# Patient Record
Sex: FEMALE | Race: WHITE | NOT HISPANIC OR LATINO | Employment: FULL TIME
[De-identification: names, ages, dates, MRNs, and addresses within clinical notes are randomized per-mention and may not be internally consistent; named-entity substitution may affect disease eponyms.]

---

## 2017-07-29 ENCOUNTER — HEALTH MAINTENANCE LETTER (OUTPATIENT)
Age: 47
End: 2017-07-29

## 2018-12-04 ENCOUNTER — OFFICE VISIT (OUTPATIENT)
Dept: FAMILY MEDICINE | Facility: CLINIC | Age: 48
End: 2018-12-04
Payer: COMMERCIAL

## 2018-12-04 VITALS
OXYGEN SATURATION: 98 % | TEMPERATURE: 98.3 F | BODY MASS INDEX: 25.1 KG/M2 | DIASTOLIC BLOOD PRESSURE: 76 MMHG | HEIGHT: 62 IN | SYSTOLIC BLOOD PRESSURE: 110 MMHG | HEART RATE: 92 BPM | WEIGHT: 136.4 LBS

## 2018-12-04 DIAGNOSIS — J45.20 MILD INTERMITTENT ASTHMA WITHOUT COMPLICATION: ICD-10-CM

## 2018-12-04 DIAGNOSIS — E16.2 HYPOGLYCEMIA, UNSPECIFIED: ICD-10-CM

## 2018-12-04 DIAGNOSIS — Z23 NEED FOR PROPHYLACTIC VACCINATION AND INOCULATION AGAINST INFLUENZA: ICD-10-CM

## 2018-12-04 DIAGNOSIS — Z00.00 ENCOUNTER FOR WELL ADULT EXAM WITHOUT ABNORMAL FINDINGS: Primary | ICD-10-CM

## 2018-12-04 LAB
ANION GAP SERPL CALCULATED.3IONS-SCNC: 4 MMOL/L (ref 3–14)
BUN SERPL-MCNC: 15 MG/DL (ref 7–30)
CALCIUM SERPL-MCNC: 8.7 MG/DL (ref 8.5–10.1)
CHLORIDE SERPL-SCNC: 107 MMOL/L (ref 94–109)
CHOLEST SERPL-MCNC: 224 MG/DL
CO2 SERPL-SCNC: 31 MMOL/L (ref 20–32)
CREAT SERPL-MCNC: 0.76 MG/DL (ref 0.52–1.04)
GFR SERPL CREATININE-BSD FRML MDRD: 81 ML/MIN/1.7M2
GLUCOSE SERPL-MCNC: 77 MG/DL (ref 70–99)
HDLC SERPL-MCNC: 66 MG/DL
LDLC SERPL CALC-MCNC: 130 MG/DL
NONHDLC SERPL-MCNC: 158 MG/DL
POTASSIUM SERPL-SCNC: 3.8 MMOL/L (ref 3.4–5.3)
SODIUM SERPL-SCNC: 142 MMOL/L (ref 133–144)
TRIGL SERPL-MCNC: 142 MG/DL
TSH SERPL DL<=0.005 MIU/L-ACNC: 1.24 MU/L (ref 0.4–4)

## 2018-12-04 PROCEDURE — 80061 LIPID PANEL: CPT | Performed by: FAMILY MEDICINE

## 2018-12-04 PROCEDURE — 36415 COLL VENOUS BLD VENIPUNCTURE: CPT | Performed by: FAMILY MEDICINE

## 2018-12-04 PROCEDURE — 99396 PREV VISIT EST AGE 40-64: CPT | Performed by: FAMILY MEDICINE

## 2018-12-04 PROCEDURE — 80048 BASIC METABOLIC PNL TOTAL CA: CPT | Performed by: FAMILY MEDICINE

## 2018-12-04 PROCEDURE — 84443 ASSAY THYROID STIM HORMONE: CPT | Performed by: FAMILY MEDICINE

## 2018-12-04 RX ORDER — HYDROCHLOROTHIAZIDE 25 MG/1
1 TABLET ORAL DAILY
COMMUNITY
Start: 2018-10-09 | End: 2018-12-04

## 2018-12-04 RX ORDER — HYDROCHLOROTHIAZIDE 25 MG/1
25 TABLET ORAL DAILY
Qty: 90 TABLET | Refills: 3 | Status: SHIPPED | OUTPATIENT
Start: 2018-12-04 | End: 2019-04-15

## 2018-12-04 RX ORDER — ALBUTEROL SULFATE 90 UG/1
2 AEROSOL, METERED RESPIRATORY (INHALATION) EVERY 6 HOURS PRN
Qty: 1 INHALER | Refills: 1 | Status: SHIPPED | OUTPATIENT
Start: 2018-12-04 | End: 2019-04-15

## 2018-12-04 ASSESSMENT — ENCOUNTER SYMPTOMS
HEMATOCHEZIA: 0
DYSURIA: 0
HEMATURIA: 0
NAUSEA: 0
MYALGIAS: 1
BREAST MASS: 0
DIZZINESS: 0
CONSTIPATION: 0
JOINT SWELLING: 1
NERVOUS/ANXIOUS: 0
ARTHRALGIAS: 1
COUGH: 0
PALPITATIONS: 0
FEVER: 0
EYE PAIN: 0
HEARTBURN: 0
DIARRHEA: 0
SHORTNESS OF BREATH: 0
CHILLS: 0
HEADACHES: 0
PARESTHESIAS: 0
ABDOMINAL PAIN: 0
WEAKNESS: 0
SORE THROAT: 0
FREQUENCY: 0

## 2018-12-04 NOTE — PROGRESS NOTES
SUBJECTIVE:   CC: Nessa Briceño is an 48 year old woman who presents for preventive health visit.     Physical   Annual:     Getting at least 3 servings of Calcium per day:  NO    Bi-annual eye exam:  Yes    Dental care twice a year:  NO    Sleep apnea or symptoms of sleep apnea:  None    Diet:  Regular (no restrictions)    Frequency of exercise:  None    Taking medications regularly:  Yes    Medication side effects:  Not applicable    Additional concerns today:  No    PHQ-2 Total Score: 2              Asthma Follow-Up    Was ACT completed today?  No      Respiratory symptoms:   Cough: No   Wheezing: No   Shortness of breath: No    Use of short- acting(rescue) inhaler: albuterol     Taking controlled (daily) meds as prescribed: No    ER/UC visits or hospital admissions since last visit: none     Recent asthma triggers that patient is dealing with: None       Today's PHQ-2 Score:   PHQ-2 ( 1999 [employer]) 12/4/2018   Q1: Little interest or pleasure in doing things 1   Q2: Feeling down, depressed or hopeless 1   PHQ-2 Score 2   Q1: Little interest or pleasure in doing things Several days   Q2: Feeling down, depressed or hopeless Several days   PHQ-2 Score 2       Abuse: Current or Past(Physical, Sexual or Emotional)- No  Do you feel safe in your environment? Sometimes, works at a [employer]    Social History   Substance Use Topics     Smoking status: Never Smoker     Smokeless tobacco: Never Used     Alcohol use Yes      Comment: occ     Alcohol Use 12/4/2018   If you drink alcohol do you typically have greater than 3 drinks per day OR greater than 7 drinks per week? No   No flowsheet data found.    Reviewed orders with patient.  Reviewed health maintenance and updated orders accordingly - Yes  Labs reviewed in EPIC    Mammogram Screening: Patient under age 50, mutual decision reflected in health maintenance.      Pertinent mammograms are reviewed under the imaging tab.  History of abnormal Pap smear: Status post benign  "hysterectomy. Health Maintenance and Surgical History updated.  PAP / HPV 6/3/2009 4/24/2008   PAP OTHER-NIL EM>40 NIL     Reviewed and updated as needed this visit by clinical staff  Tobacco  Allergies  Meds  Problems  Med Hx  Surg Hx  Fam Hx  Soc Hx          Reviewed and updated as needed this visit by Provider  Allergies  Meds  Problems            Review of Systems   Constitutional: Negative for chills and fever.   HENT: Negative for congestion, ear pain, hearing loss and sore throat.    Eyes: Positive for visual disturbance. Negative for pain.   Respiratory: Negative for cough and shortness of breath.    Cardiovascular: Positive for peripheral edema. Negative for chest pain and palpitations.   Gastrointestinal: Negative for abdominal pain, constipation, diarrhea, heartburn, hematochezia and nausea.   Breasts:  Negative for tenderness, breast mass and discharge.   Genitourinary: Negative for dysuria, frequency, genital sores, hematuria, pelvic pain, urgency, vaginal bleeding and vaginal discharge.   Musculoskeletal: Positive for arthralgias, joint swelling and myalgias.   Skin: Negative for rash.   Neurological: Negative for dizziness, weakness, headaches and paresthesias.   Psychiatric/Behavioral: Negative for mood changes. The patient is not nervous/anxious.           OBJECTIVE:   /76 (BP Location: Right arm, Patient Position: Chair, Cuff Size: Adult Regular)  Pulse 92  Temp 98.3  F (36.8  C) (Tympanic)  Ht 5' 1.5\" (1.562 m)  Wt 136 lb 6.4 oz (61.9 kg)  LMP 09/28/2011  SpO2 98%  Breastfeeding? No  BMI 25.36 kg/m2  Physical Exam  GENERAL: healthy, alert and no distress  EYES: Eyes grossly normal to inspection, PERRL and conjunctivae and sclerae normal  HENT: ear canals and TM's normal, nose and mouth without ulcers or lesions  NECK: no adenopathy, no asymmetry, masses, or scars and thyroid normal to palpation  RESP: lungs clear to auscultation - no rales, rhonchi or wheezes  BREAST: " "normal without masses, tenderness or nipple discharge and no palpable axillary masses or adenopathy  CV: regular rate and rhythm, normal S1 S2, no S3 or S4, no murmur, click or rub, no peripheral edema and peripheral pulses strong  ABDOMEN: soft, nontender, no hepatosplenomegaly, no masses and bowel sounds normal  MS: no gross musculoskeletal defects noted, no edema  SKIN: no suspicious lesions or rashes  NEURO: Normal strength and tone, mentation intact and speech normal  PSYCH: mentation appears normal, affect normal/bright    Diagnostic Test Results:  none     ASSESSMENT/PLAN:   1. Encounter for well adult exam without abnormal findings    - Lipid panel reflex to direct LDL Fasting  - Basic metabolic panel  - TSH with free T4 reflex  - hydrochlorothiazide (HYDRODIURIL) 25 MG tablet; Take 1 tablet (25 mg) by mouth daily  Dispense: 90 tablet; Refill: 3    2. HYPOGLYCEMIA NOS      3. Mild intermittent asthma without complication    - albuterol (PROAIR HFA/PROVENTIL HFA/VENTOLIN HFA) 108 (90 Base) MCG/ACT inhaler; Inhale 2 puffs into the lungs every 6 hours as needed for shortness of breath / dyspnea or wheezing  Dispense: 1 Inhaler; Refill: 1          COUNSELING:  Reviewed preventive health counseling, as reflected in patient instructions    BP Readings from Last 1 Encounters:   12/04/18 110/76     Estimated body mass index is 25.36 kg/(m^2) as calculated from the following:    Height as of this encounter: 5' 1.5\" (1.562 m).    Weight as of this encounter: 136 lb 6.4 oz (61.9 kg).           reports that she has never smoked. She has never used smokeless tobacco.      Counseling Resources:  ATP IV Guidelines  Pooled Cohorts Equation Calculator  Breast Cancer Risk Calculator  FRAX Risk Assessment  ICSI Preventive Guidelines  Dietary Guidelines for Americans, 2010  USDA's MyPlate  ASA Prophylaxis  Lung CA Screening    Mita Delgadillo MD  [facility]  "

## 2018-12-04 NOTE — MR AVS SNAPSHOT
After Visit Summary   12/4/2018    Nessa Briceño    MRN: [id number]           Patient Information     Date Of Birth          1970        Visit Information        Provider Department      12/4/2018 2:20 PM Mita Delgadillo MD [facility]        Today's Diagnoses     Encounter for well adult exam without abnormal findings    -  1    HYPOGLYCEMIA NOS        Mild intermittent asthma without complication        Need for prophylactic vaccination and inoculation against influenza          Care Instructions      Preventive Health Recommendations  Female Ages 40 to 49    Yearly exam:     See your health care provider every year in order to  1. Review health changes.   2. Discuss preventive care.    3. Review your medicines if your doctor prescribed any.      Get a Pap test every three years (unless you have an abnormal result and your provider advises testing more often).      If you get Pap tests with HPV test, you only need to test every 5 years, unless you have an abnormal result. You do not need a Pap test if your uterus was removed (hysterectomy) and you have not had cancer.      You should be tested each year for STDs (sexually transmitted diseases), if you're at risk.     Ask your doctor if you should have a mammogram.      Have a colonoscopy (test for colon cancer) if someone in your family has had colon cancer or polyps before age 50.       Have a cholesterol test every 5 years.       Have a diabetes test (fasting glucose) after age 45. If you are at risk for diabetes, you should have this test every 3 years.    Shots: Get a flu shot each year. Get a tetanus shot every 10 years.     Nutrition:     Eat at least 5 servings of fruits and vegetables each day.    Eat whole-grain bread, whole-wheat pasta and brown rice instead of white grains and rice.    Get adequate Calcium and Vitamin D.      Lifestyle    Exercise at least 150 minutes a week (an average of 30 minutes a day,  "5 days a week). This will help you control your weight and prevent disease.    Limit alcohol to one drink per day.    No smoking.     Wear sunscreen to prevent skin cancer.    See your dentist every six months for an exam and cleaning.          Follow-ups after your visit        Who to contact     If you have questions or need follow up information about today's clinic visit or your schedule please contact [facility] directly at [phone].  Normal or non-critical lab and imaging results will be communicated to you by Thanxhart, letter or phone within 4 business days after the clinic has received the results. If you do not hear from us within 7 days, please contact the clinic through avVenta or phone. If you have a critical or abnormal lab result, we will notify you by phone as soon as possible.  Submit refill requests through avVenta or call your pharmacy and they will forward the refill request to us. Please allow 3 business days for your refill to be completed.          Additional Information About Your Visit        avVenta Information     avVenta gives you secure access to your electronic health record. If you see a primary care provider, you can also send messages to your care team and make appointments. If you have questions, please call your primary care clinic.  If you do not have a primary care provider, please call [phone] and they will assist you.        Care EveryWhere ID     This is your Care EveryWhere ID. This could be used by other organizations to access your [facility] medical records  [id number]        Your Vitals Were     Pulse Temperature Height Last Period Pulse Oximetry Breastfeeding?    92 98.3  F (36.8  C) (Tympanic) 5' 1.5\" (1.562 m) 09/28/2011 98% No    BMI (Body Mass Index)                   25.36 kg/m2            Blood Pressure from Last 3 Encounters:   12/04/18 110/76   06/25/14 112/76   06/24/14 107/81    Weight from Last 3 Encounters:   12/04/18 136 lb 6.4 oz " (61.9 kg)   06/25/14 145 lb (65.8 kg)   02/06/12 143 lb 12.8 oz (65.2 kg)              We Performed the Following     Basic metabolic panel     Lipid panel reflex to direct LDL Fasting     TSH with free T4 reflex          Today's Medication Changes          These changes are accurate as of 12/4/18  2:51 PM.  If you have any questions, ask your nurse or doctor.               These medicines have changed or have updated prescriptions.        Dose/Directions    * albuterol (2.5 MG/3ML) 0.083% neb solution   Commonly known as:  PROVENTIL   This may have changed:  Another medication with the same name was added. Make sure you understand how and when to take each.   Used for:  Intermittent asthma   Changed by:  Mita Delgadillo MD        every 4 hours prn   Quantity:  1 Each   Refills:  3       * albuterol 108 (90 Base) MCG/ACT inhaler   Commonly known as:  PROAIR HFA/PROVENTIL HFA/VENTOLIN HFA   This may have changed:  You were already taking a medication with the same name, and this prescription was added. Make sure you understand how and when to take each.   Used for:  Mild intermittent asthma without complication   Changed by:  Mita Delgadillo MD        Dose:  2 puff   Inhale 2 puffs into the lungs every 6 hours as needed for shortness of breath / dyspnea or wheezing   Quantity:  1 Inhaler   Refills:  1       hydrochlorothiazide 25 MG tablet   Commonly known as:  HYDRODIURIL   This may have changed:  how to take this   Used for:  Encounter for well adult exam without abnormal findings   Changed by:  Mita Delgadillo MD        Dose:  25 mg   Take 1 tablet (25 mg) by mouth daily   Quantity:  90 tablet   Refills:  3       * Notice:  This list has 2 medication(s) that are the same as other medications prescribed for you. Read the directions carefully, and ask your doctor or other care provider to review them with you.         Where to get your medicines      These medications were sent to [id number] [place], [place] - [address]  [address] [facility] [address]    Hours:  Closed 10-16-08 business to [facility] Phone:  [phone]     albuterol 108 (90 Base) MCG/ACT inhaler    hydrochlorothiazide 25 MG tablet                Primary Care Provider Office Phone # Fax #    Mita Delgadillo [phone] [address] [facility] [address]        Equal Access to Services     HODAN SIBLEY : Hadii aad ku hadasho Soomaali, waaxda luqadaha, qaybta kaalmada adeegyada, waxay idiin hayaan burt gilman . So [facility] [phone].    ATENCIÓN: Si habla español, tiene a hurtado disposición servicios gratuitos de asistencia lingüística. [facility] [phone].    We comply with applicable federal civil rights laws and [place] laws. We do not discriminate on the basis of race, color, national origin, age, disability, sex, sexual orientation, or gender identity.            Thank you!     Thank you for choosing [facility]  for your care. Our goal is always to provide you with excellent care. Hearing back from our patients is one way we can continue to improve our services. Please take a few minutes to complete the written survey that you may receive in the mail after your visit with us. Thank you!             Your Updated Medication List - Protect others around you: Learn how to safely use, store and throw away your medicines at www.disposemymeds.org.          This list is accurate as of 12/4/18  2:51 PM.  Always use your most recent med list.                   Brand Name Dispense Instructions for use Diagnosis    * albuterol (2.5 MG/3ML) 0.083% neb solution    PROVENTIL    1 Each    every 4 hours prn    Intermittent asthma       * albuterol 108 (90 Base) MCG/ACT inhaler    PROAIR HFA/PROVENTIL HFA/VENTOLIN HFA    1 Inhaler    Inhale 2 puffs into the lungs every 6 hours as needed for shortness of breath / dyspnea or wheezing    Mild intermittent  asthma without complication       albuterol 90 MCG/ACT inhaler     1 Inhaler    Inhale 2 puffs into the lungs every 6 hours as needed for shortness of breath / dyspnea.    Intermittent asthma       BLOOD GLUCOSE TEST STRIPS STRP     100 Strip    test four times a day-one touch ultra    Hypoglycemia, unspecified       DIABETIC STERILE LANCETS device     DQS    test four times a day-one touch ultra    Hypoglycemia, unspecified       hydrochlorothiazide 25 MG tablet    HYDRODIURIL    90 tablet    Take 1 tablet (25 mg) by mouth daily    Encounter for well adult exam without abnormal findings       HYDROcodone-acetaminophen 5-325 MG tablet    NORCO    15 tablet    Take 1-2 tablets by mouth every 4 hours as needed for moderate to severe pain        ibuprofen 600 MG tablet    ADVIL/MOTRIN     1 TABLET 4 TIMES DAILY AS NEEDED        omeprazole 20 MG DR capsule    priLOSEC    90 capsule    Take 1 capsule by mouth daily.    GERD (gastroesophageal reflux disease)       * Notice:  This list has 2 medication(s) that are the same as other medications prescribed for you. Read the directions carefully, and ask your doctor or other care provider to review them with you.

## 2018-12-04 NOTE — NURSING NOTE
"Chief Complaint   Patient presents with     Physical       Initial /76 (BP Location: Right arm, Patient Position: Chair, Cuff Size: Adult Regular)  Pulse 92  Temp 98.3  F (36.8  C) (Tympanic)  Ht 5' 1.5\" (1.562 m)  Wt 136 lb 6.4 oz (61.9 kg)  LMP 09/28/2011  SpO2 98%  Breastfeeding? No  BMI 25.36 kg/m2 Estimated body mass index is 25.36 kg/(m^2) as calculated from the following:    Height as of this encounter: 5' 1.5\" (1.562 m).    Weight as of this encounter: 136 lb 6.4 oz (61.9 kg).    Patient presents to the clinic using No DME    Health Maintenance that is potentially due pending provider review:  NONE    n/a    Is there anyone who you would like to be able to receive your results? No  If yes have patient fill out HANNA    "

## 2019-01-07 PROBLEM — J45.20 MILD INTERMITTENT ASTHMA WITHOUT COMPLICATION: Status: ACTIVE | Noted: 2019-01-07

## 2019-02-05 ENCOUNTER — TELEPHONE (OUTPATIENT)
Dept: FAMILY MEDICINE | Facility: CLINIC | Age: 49
End: 2019-02-05

## 2019-02-05 NOTE — TELEPHONE ENCOUNTER
Panel Management Review      Patient has the following on her problem list:     Asthma review     ACT Total Scores 6/23/2011   ACT TOTAL SCORE 22   ASTHMA ER VISITS 0 = None   ASTHMA HOSPITALIZATIONS 0 = None      1. Is Asthma diagnosis on the Problem List? Yes    2. Is Asthma listed on Health Maintenance? Yes    3. Patient is due for:  ACT and AAP      Composite cancer screening  Chart review shows that this patient is due/due soon for the following None  Summary:    Patient is due/failing the following:   AAP and ACT    Action needed:   Patient needs to do ACT.    Type of outreach:    Phone, left message for patient to call back.     Questions for provider review:    None                                                                                                                                    Lianet Philip [facility]       Chart routed to Care Team .

## 2019-03-05 ENCOUNTER — MYC MEDICAL ADVICE (OUTPATIENT)
Dept: FAMILY MEDICINE | Facility: CLINIC | Age: 49
End: 2019-03-05

## 2019-03-06 ENCOUNTER — MYC MEDICAL ADVICE (OUTPATIENT)
Dept: FAMILY MEDICINE | Facility: CLINIC | Age: 49
End: 2019-03-06

## 2019-03-06 DIAGNOSIS — J45.20 MILD INTERMITTENT ASTHMA WITHOUT COMPLICATION: Primary | ICD-10-CM

## 2019-04-15 ENCOUNTER — MYC MEDICAL ADVICE (OUTPATIENT)
Dept: FAMILY MEDICINE | Facility: CLINIC | Age: 49
End: 2019-04-15

## 2019-04-15 DIAGNOSIS — Z00.00 ENCOUNTER FOR WELL ADULT EXAM WITHOUT ABNORMAL FINDINGS: ICD-10-CM

## 2019-04-15 DIAGNOSIS — J45.20 MILD INTERMITTENT ASTHMA WITHOUT COMPLICATION: ICD-10-CM

## 2019-04-15 RX ORDER — HYDROCHLOROTHIAZIDE 25 MG/1
25 TABLET ORAL DAILY
Qty: 90 TABLET | Refills: 2 | Status: SHIPPED | OUTPATIENT
Start: 2019-04-15 | End: 2022-07-06

## 2019-04-15 RX ORDER — ALBUTEROL SULFATE 90 UG/1
2 AEROSOL, METERED RESPIRATORY (INHALATION) EVERY 6 HOURS PRN
Qty: 18 G | Refills: 1 | Status: SHIPPED | OUTPATIENT
Start: 2019-04-15

## 2019-04-24 ASSESSMENT — ASTHMA QUESTIONNAIRES: ACT_TOTALSCORE: 24

## 2019-07-02 ENCOUNTER — TELEPHONE (OUTPATIENT)
Dept: FAMILY MEDICINE | Facility: CLINIC | Age: 49
End: 2019-07-02

## 2019-07-02 NOTE — TELEPHONE ENCOUNTER
Panel Management Review      Patient has the following on her problem list:     Asthma review     ACT Total Scores 4/23/2019   ACT TOTAL SCORE -   ASTHMA ER VISITS -   ASTHMA HOSPITALIZATIONS -   ACT TOTAL SCORE (Goal Greater than or Equal to 20) 24   In the past 12 months, how many times did you visit the emergency room for your asthma without being admitted to the hospital? 0   In the past 12 months, how many times were you hospitalized overnight because of your asthma? 0      1. Is Asthma diagnosis on the Problem List? Yes    2. Is Asthma listed on Health Maintenance? Yes    3. Patient is due for:  none      Composite cancer screening  Chart review shows that this patient is due/due soon for the following Mammogram, Colonoscopy and Fecal Colorectal (FIT)  Summary:    Patient is due/failing the following:   COLONOSCOPY, FIT and MAMMOGRAM    Action needed:   Needs to schedule a mammogram, colonoscopy or FIT test    Type of outreach:    Voice mail message to schedule a mammogram and colonoscopy or FIT test    Questions for provider review:    None                                                                                                                                    Lianet Philip CMA

## 2019-10-15 ENCOUNTER — OFFICE VISIT (OUTPATIENT)
Dept: FAMILY MEDICINE | Facility: CLINIC | Age: 49
End: 2019-10-15
Payer: COMMERCIAL

## 2019-10-15 VITALS
HEIGHT: 62 IN | WEIGHT: 139.8 LBS | RESPIRATION RATE: 16 BRPM | SYSTOLIC BLOOD PRESSURE: 120 MMHG | BODY MASS INDEX: 25.73 KG/M2 | DIASTOLIC BLOOD PRESSURE: 82 MMHG | HEART RATE: 83 BPM | OXYGEN SATURATION: 96 % | TEMPERATURE: 98.5 F

## 2019-10-15 DIAGNOSIS — K21.9 GASTROESOPHAGEAL REFLUX DISEASE, ESOPHAGITIS PRESENCE NOT SPECIFIED: ICD-10-CM

## 2019-10-15 DIAGNOSIS — Z12.11 COLON CANCER SCREENING: ICD-10-CM

## 2019-10-15 DIAGNOSIS — R10.84 GENERALIZED ABDOMINAL PAIN: Primary | ICD-10-CM

## 2019-10-15 LAB
ALBUMIN UR-MCNC: NEGATIVE MG/DL
APPEARANCE UR: CLEAR
BILIRUB UR QL STRIP: NEGATIVE
COLOR UR AUTO: YELLOW
GLUCOSE UR STRIP-MCNC: NEGATIVE MG/DL
HGB UR QL STRIP: NEGATIVE
KETONES UR STRIP-MCNC: NEGATIVE MG/DL
LEUKOCYTE ESTERASE UR QL STRIP: NEGATIVE
NITRATE UR QL: NEGATIVE
PH UR STRIP: 7 PH (ref 5–7)
SOURCE: ABNORMAL
SP GR UR STRIP: 1.02 (ref 1–1.03)
UROBILINOGEN UR STRIP-ACNC: 2 EU/DL (ref 0.2–1)

## 2019-10-15 PROCEDURE — 81003 URINALYSIS AUTO W/O SCOPE: CPT | Performed by: FAMILY MEDICINE

## 2019-10-15 PROCEDURE — 99214 OFFICE O/P EST MOD 30 MIN: CPT | Performed by: FAMILY MEDICINE

## 2019-10-15 ASSESSMENT — MIFFLIN-ST. JEOR: SCORE: 1199.44

## 2019-10-15 NOTE — NURSING NOTE
"Chief Complaint   Patient presents with     Abdominal Pain       Initial /82 (BP Location: Right arm, Patient Position: Chair, Cuff Size: Adult Regular)   Pulse 83   Temp 98.5  F (36.9  C) (Tympanic)   Resp 16   Ht 1.562 m (5' 1.5\")   Wt 63.4 kg (139 lb 12.8 oz)   LMP 09/28/2011   SpO2 96%   BMI 25.99 kg/m   Estimated body mass index is 25.99 kg/m  as calculated from the following:    Height as of this encounter: 1.562 m (5' 1.5\").    Weight as of this encounter: 63.4 kg (139 lb 12.8 oz).    Patient presents to the clinic using No DME    Health Maintenance that is potentially due pending provider review:  Mammogram, Colonoscopy/FIT and ACT    Gave pt phone number/pended order to schedule mammo and/or colonoscopy(or FIT)    Is there anyone who you would like to be able to receive your results? No  If yes have patient fill out HANNA    "

## 2019-10-15 NOTE — PATIENT INSTRUCTIONS
[facility] Schedule    [facility] ~ [phone]  One Day weekly- Alternating Days    [place] ~ [phone]  Every other Monday or Wednesday   & one Saturday morning a month    [place] ~ [phone]  Every Other Monday Afternoon    ~ [place] ~   Every other Monday Morning    [place] ~ [phone]  Every Monday morning  Every Tuesday afternoon  Wed, Thurs, Friday morning & afternoon  Updated 06/18/18        Set up colonoscopy     Set up  [phone]

## 2019-10-15 NOTE — PROGRESS NOTES
"Subjective     Nessa Briceño is a 49 year old female who presents to clinic today for the following health issues:    HPI   Abdominal Pain      Duration: over past year off and on getting worse     Description (location/character/radiation): mid abdomen to low pelvis     Feels bloated and sharp pain that lasts for several minutes and then will be off and on for an hour or so    There is no association with food it seems to just go away and nothing in particular helps this        Associated flank pain: None    Intensity:  severe, 0-8/10    Accompanying signs and symptoms:        Fever/Chills: no        Gas/Bloating: no        Nausea/vomitting: no        Diarrhea: no        Dysuria or Hematuria: no     History (previous similar pain/trauma/previous testing): none    Precipitating or alleviating factors:       Pain worse with eating/BM/urination: none       Pain relieved by BM: no     Therapies tried and outcome: ibuprofen, tylenol    LMP:  not applicable              Reviewed and updated as needed this visit by Provider  Tobacco  Allergies  Meds  Problems  Med Hx  Surg Hx  Fam Hx         Review of Systems   ROS COMP: Constitutional, HEENT, cardiovascular, pulmonary, gi and gu systems are negative, except as otherwise noted.      Objective    /82 (BP Location: Right arm, Patient Position: Chair, Cuff Size: Adult Regular)   Pulse 83   Temp 98.5  F (36.9  C) (Tympanic)   Resp 16   Ht 1.562 m (5' 1.5\")   Wt 63.4 kg (139 lb 12.8 oz)   LMP 09/28/2011   SpO2 96%   BMI 25.99 kg/m    Body mass index is 25.99 kg/m .  Physical Exam   GENERAL APPEARANCE: healthy, alert and no distress  NECK: no adenopathy, no asymmetry, masses, or scars and thyroid normal to palpation  RESP: lungs clear to auscultation - no rales, rhonchi or wheezes  CV: regular rates and rhythm, normal S1 S2, no S3 or S4 and no murmur, click or rub  ABDOMEN: soft, nontender, without hepatosplenomegaly or masses and bowel sounds " "normal  PSYCH: mentation appears normal and affect normal/bright    Diagnostic Test Results:  Labs reviewed in Epic        Assessment & Plan     1. Generalized abdominal pain  Not a clear etiol rule out ovarian issue vs colitis vs IBS   - *UA reflex to Microscopic and Culture ([place] and [facility] (except [place] and [place])  - CT Abdomen wo & w & Pelvis w Contrast; Future    2. Gastroesophageal reflux disease, esophagitis presence not specified  Stable no change in treatment plan.   - omeprazole (PRILOSEC) 20 MG DR capsule; Take 1 capsule (20 mg) by mouth as needed  Dispense: 90 capsule; Refill: 3    3. Colon cancer screening    - GASTROENTEROLOGY ADULT REF PROCEDURE ONLY     BMI:   Estimated body mass index is 25.99 kg/m  as calculated from the following:    Height as of this encounter: 1.562 m (5' 1.5\").    Weight as of this encounter: 63.4 kg (139 lb 12.8 oz).           Patient Instructions   [facility] ~ [phone]  One Day weekly- Alternating Days    [place] ~ [phone]  Every other Monday or Wednesday   & one Saturday morning a month    [place] ~ [phone]  Every Other Monday Afternoon    ~ [place] ~   Every other Monday Morning    [place] ~ [phone]  Every Monday morning  Every Tuesday afternoon  Wed, Thurs, Friday morning & afternoon  Updated 06/18/18        Set up colonoscopy     Set up  [phone]        Return in about 1 week (around 10/22/2019), or if symptoms worsen or fail to improve.      Risks, benefits, side effects and rationale for treatment plan fully discussed with the patient and understanding expressed.   Mita Delgadillo MD  [facility]      "

## 2019-10-16 ASSESSMENT — ASTHMA QUESTIONNAIRES: ACT_TOTALSCORE: 25

## 2019-12-03 ENCOUNTER — HOSPITAL ENCOUNTER (OUTPATIENT)
Dept: CT IMAGING | Facility: CLINIC | Age: 49
Discharge: HOME | End: 2019-12-03
Attending: FAMILY MEDICINE | Admitting: FAMILY MEDICINE
Payer: COMMERCIAL

## 2019-12-03 DIAGNOSIS — R10.84 GENERALIZED ABDOMINAL PAIN: ICD-10-CM

## 2019-12-03 PROCEDURE — 25000125 ZZHC RX 250: Performed by: RADIOLOGY

## 2019-12-03 PROCEDURE — 25000128 H RX IP 250 OP 636: Performed by: RADIOLOGY

## 2019-12-03 PROCEDURE — 74177 CT ABD & PELVIS W/CONTRAST: CPT

## 2019-12-03 RX ORDER — IOPAMIDOL 755 MG/ML
68 INJECTION, SOLUTION INTRAVASCULAR ONCE
Status: COMPLETED | OUTPATIENT
Start: 2019-12-03 | End: 2019-12-03

## 2019-12-03 RX ADMIN — SODIUM CHLORIDE 57 ML: 9 INJECTION, SOLUTION INTRAVENOUS at 10:27

## 2019-12-03 RX ADMIN — IOPAMIDOL 68 ML: 755 INJECTION, SOLUTION INTRAVENOUS at 10:27

## 2020-09-01 ENCOUNTER — OFFICE VISIT (OUTPATIENT)
Dept: FAMILY MEDICINE | Facility: CLINIC | Age: 50
End: 2020-09-01
Payer: COMMERCIAL

## 2020-09-01 ENCOUNTER — ANCILLARY PROCEDURE (OUTPATIENT)
Dept: GENERAL RADIOLOGY | Facility: CLINIC | Age: 50
End: 2020-09-01
Attending: FAMILY MEDICINE
Payer: COMMERCIAL

## 2020-09-01 VITALS
DIASTOLIC BLOOD PRESSURE: 80 MMHG | RESPIRATION RATE: 14 BRPM | HEART RATE: 72 BPM | SYSTOLIC BLOOD PRESSURE: 118 MMHG | BODY MASS INDEX: 27.51 KG/M2 | WEIGHT: 148 LBS | OXYGEN SATURATION: 99 % | TEMPERATURE: 98 F

## 2020-09-01 DIAGNOSIS — M25.551 HIP PAIN, RIGHT: ICD-10-CM

## 2020-09-01 DIAGNOSIS — M25.551 HIP PAIN, RIGHT: Primary | ICD-10-CM

## 2020-09-01 PROCEDURE — 99214 OFFICE O/P EST MOD 30 MIN: CPT | Performed by: FAMILY MEDICINE

## 2020-09-01 PROCEDURE — 73502 X-RAY EXAM HIP UNI 2-3 VIEWS: CPT

## 2020-09-01 ASSESSMENT — ASTHMA QUESTIONNAIRES
QUESTION_4 LAST FOUR WEEKS HOW OFTEN HAVE YOU USED YOUR RESCUE INHALER OR NEBULIZER MEDICATION (SUCH AS ALBUTEROL): NOT AT ALL
ACT_TOTALSCORE: 25
QUESTION_3 LAST FOUR WEEKS HOW OFTEN DID YOUR ASTHMA SYMPTOMS (WHEEZING, COUGHING, SHORTNESS OF BREATH, CHEST TIGHTNESS OR PAIN) WAKE YOU UP AT NIGHT OR EARLIER THAN USUAL IN THE MORNING: NOT AT ALL
QUESTION_1 LAST FOUR WEEKS HOW MUCH OF THE TIME DID YOUR ASTHMA KEEP YOU FROM GETTING AS MUCH DONE AT WORK, SCHOOL OR AT HOME: NONE OF THE TIME
QUESTION_5 LAST FOUR WEEKS HOW WOULD YOU RATE YOUR ASTHMA CONTROL: COMPLETELY CONTROLLED
QUESTION_2 LAST FOUR WEEKS HOW OFTEN HAVE YOU HAD SHORTNESS OF BREATH: NOT AT ALL

## 2020-09-01 ASSESSMENT — PAIN SCALES - GENERAL: PAINLEVEL: NO PAIN (0)

## 2020-09-01 NOTE — PROGRESS NOTES
Subjective     Nessa Briceño is a 50 year old female who presents to clinic today for the following health issues:    HPI       Musculoskeletal problem/pain  Onset/Duration: years but worse about 1 year   Description  Location: RIGHT HIP  - right  Joint Swelling: no  Redness: no  Pain: YES  Warmth: no  Intensity:  moderate  Progression of Symptoms:  worsening  History  Trauma to the area: no  Recent illness:  no  Previous similar problem: no  Previous evaluation:  no    Headache  Onset/Duration: 1-2 years   Description  Location: bilateral in the occipital area   Character: throbbing pain  Frequency:  Weekly   Duration:  Varies hours   Wake with headaches: YES  Able to do daily activities when headache present: YES  Intensity:  moderate  Progression of Symptoms:  worsening  Accompanying signs and symptoms:  Stiff neck: no  Neck or upper back pain: no  Sinus or URI symptoms no   Fever: no  Nausea or vomiting: YES  Dizziness: no  Numbness/tingling: no  Weakness: no  Visual changes: flashing lights  History  Head trauma: no  Family history of migraines: YES  Daily pain medication use: YES  Previous tests for headaches: no  Neurologist evaluation: no  Precipitating or Alleviating factors (light/sound/sleep/caffeine): IBU and Tylenol   Therapies tried and outcome: Ibuprofen (Advil, Motrin) and Tylenol    Outcome - usually effective      Genitourinary - Female  Incontinence   Onset/Duration: 1 year   Description:   Painful urination (Dysuria): no           Frequency: no  Blood in urine (Hematuria): no  Delay in urine (Hesitency): no  Intensity: mild  Progression of Symptoms:  improving      Review of Systems   Constitutional, HEENT, cardiovascular, pulmonary, gi and gu systems are negative, except as otherwise noted.      Objective    /80   Pulse 72   Temp 98  F (36.7  C) (Tympanic)   Resp 14   Wt 67.1 kg (148 lb)   LMP 09/28/2011   SpO2 99%   BMI 27.51 kg/m    Body mass index is 27.51 kg/m .  Physical Exam  "  GENERAL APPEARANCE: healthy, alert and no distress  ORTHO: Hip Exam: Palpation: Tender:   No palpable tenderness   Non-tender:    Range of Motion:  Full ROM, both hips  Strength:  full strength  Special tests:  not done    PSYCH: mentation appears normal and affect normal/bright    Xray - Xray is reviewed independently by Mita Delgadillo MD and negative.         Assessment & Plan     Hip pain, right  Suspect early mild OA vs tendonitis   - XR Hip Right 2-3 Views; Future  - PHYSICAL THERAPY REFERRAL; Future     BMI:   Estimated body mass index is 27.51 kg/m  as calculated from the following:    Height as of 10/15/19: 1.562 m (5' 1.5\").    Weight as of this encounter: 67.1 kg (148 lb).           Patient Instructions   Set up PT     Ice alternating with heat for pain      20 minutes ice, 20 minutes heat, 20 minutes ice 3 times daily       Return in about 6 weeks (around 10/13/2020) for mychart update.    Mita Delgadillo MD  [facility]    "

## 2020-09-02 ASSESSMENT — ASTHMA QUESTIONNAIRES: ACT_TOTALSCORE: 25

## 2020-09-03 NOTE — PATIENT INSTRUCTIONS
Set up PT     Ice alternating with heat for pain      20 minutes ice, 20 minutes heat, 20 minutes ice 3 times daily

## 2020-10-02 ENCOUNTER — VIRTUAL VISIT (OUTPATIENT)
Dept: FAMILY MEDICINE | Facility: OTHER | Age: 50
End: 2020-10-02
Payer: COMMERCIAL

## 2020-10-02 ENCOUNTER — E-VISIT (OUTPATIENT)
Dept: FAMILY MEDICINE | Facility: CLINIC | Age: 50
End: 2020-10-02
Payer: COMMERCIAL

## 2020-10-02 DIAGNOSIS — R05.9 COUGH: ICD-10-CM

## 2020-10-02 DIAGNOSIS — Z20.822 EXPOSURE TO COVID-19 VIRUS: Primary | ICD-10-CM

## 2020-10-02 DIAGNOSIS — R05.9 COUGH: Primary | ICD-10-CM

## 2020-10-02 PROCEDURE — 99207 PR NO BILLABLE SERVICE THIS VISIT: CPT | Performed by: FAMILY MEDICINE

## 2020-10-02 PROCEDURE — 99213 OFFICE O/P EST LOW 20 MIN: CPT | Mod: TEL | Performed by: PHYSICIAN ASSISTANT

## 2020-10-02 NOTE — PROGRESS NOTES
"Nessa Briceño is a 50 year old female who is being evaluated via a billable telephone visit.      The patient has been notified of following:     \"This telephone visit will be conducted via a call between you and your physician/provider. We have found that certain health care needs can be provided without the need for a physical exam.  This service lets us provide the care you need with a short phone conversation.  If a prescription is necessary we can send it directly to your pharmacy.  If lab work is needed we can place an order for that and you can then stop by our lab to have the test done at a later time.    Telephone visits are billed at different rates depending on your insurance coverage. During this emergency period, for some insurers they may be billed the same as an in-person visit.  Please reach out to your insurance provider with any questions.    If during the course of the call the physician/provider feels a telephone visit is not appropriate, you will not be charged for this service.\"    Patient has given verbal consent for Telephone visit?  Yes    What phone number would you like to be contacted at? [phone]    How would you like to obtain your AVS? Jennifer Hernandez     Nessa Briceño is a 50 year old female who presents via phone visit today for the following health issues:    HPI        Patient doing a phone visit today requesting covid testing. She was exposed and thinks that she might be having symptoms.    She said she has mild chest pressure. Headache every now and then. A little bit of runny nose with a light cough. She doesn't feel 100%.     Daughter to the emergency room a week ago and she tested positive. 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     She thinks she might have COVID.   She went to ED with daughter a week ago.  Daughter works in assisted living facility and she tested 3 times negative for COVID and then they brought her to the ED and she tested positive. "     On Tuesday Nessa started to feel off, felt warm, temp was 99. That was the last time she checked her temp.   Yesterday started to get slight cough.  She has pressure in her chest, feels like she can't cough anything up, feels like she can't take a deep breath, feels run down. Has intermittent runny nose.  Has also had a headache now and then.  But Tylenol working well for that.  Did have diarrhea today just once.      Denies ear pain, sore throat, congestion, sinus pain, nausea, vomiting, melena, hematochezia, rashes or skin changes, loss of taste or smell.     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     Review of Systems   Constitutional, HEENT, cardiovascular, pulmonary, gi and gu systems are negative, except as otherwise noted.       Objective          Vitals:  No vitals were obtained today due to virtual visit.    healthy, alert and no distress  PSYCH: Alert and oriented times 3; coherent speech, normal   rate and volume, able to articulate logical thoughts, able   to abstract reason, no tangential thoughts, no hallucinations   or delusions  Her affect is normal  RESP: No cough, no audible wheezing, able to talk in full sentences  Remainder of exam unable to be completed due to telephone visits    No results found for this or any previous visit (from the past 24 hour(s)).        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    She has active symptoms and more than 1.   Her PCP appears to have placed an order for her test.  Discussed how to schedule - they will call her.   Discussed general precautions including self isolation.   Discussed chances for false negative.  Still recommend at least 10 days from symptom onset, symptoms improving and fever free for 24 hours prior to return  to work even if negative result.   Letter given for work.   Monitor for worsening symptoms.   OTC therapies discussed.     Return in about 5 days (around 10/7/2020) for If not improving, sooner if worse or new concerns.    Options for treatment and follow-up care were reviewed with the patient and/or guardian. Patient and/or guardian engaged in the decision making process and verbalized understanding of the options discussed and agreed with the final plan.    Dane Martins PA-C  [facility]    Phone call duration:  9:10 minutes

## 2020-10-02 NOTE — PATIENT INSTRUCTIONS
Instructions for Patients  It is recommended that you have a test for coronavirus (COVID-19). This illness can cause fever, cough and trouble breathing. Many people get a mild case and get better on their own. Some people can get very sick.     Please follow these steps:    1. We will call to schedule your test.  2. A member of our care team will ask you some questions. Then, they will use a swab to collect samples from your nose and throat.     Our testing team will send you your test results.    How can I protect others?    Stay home and away from others (self-isolate) until:    You ve had no fever--and no medicine that reduces fever--for 1 full day (24 hours). And      Your other symptoms have resolved (gotten better). For example, your cough or breathing has improved. And     At least 10 days have passed since your symptoms started.    Stay at least 6 feet away from others. (If someone will drive you to your test, stay in the backseat, as far away from the  as you can.)     Don t go to work, school or anywhere else. When it s time for your test, go straight to the testing site. Don t make any stops on the way there or back.     Wash your hands and face often. Use soap and water.     Cover your mouth and nose with a mask, tissue or washcloth.     Don t touch anyone. No hugging, kissing or handshakes.    How can I take care of myself?    1. Get lots of rest. Drink extra fluids (unless a doctor has told you not to).     2. Take Tylenol (acetaminophen) for fever or pain. If you have liver or kidney problems, ask your family doctor if it's okay to take Tylenol.     Adults can take either:     650 mg (two 325 mg pills) every 4 to 6 hours, or     1,000 mg (two 500 mg pills) every 8 hours as needed.     Note: Don't take more than 3,000 mg in one day.   Acetaminophen is found in many medicines (both prescribed and over-the-counter medicines). Read all labels to be sure you don't take too much.   For children,  check the Tylenol bottle for the right dose. The dose is based on  the child's age or weight.    3. If you have other health problems (like cancer, heart failure, an organ transplant or severe kidney disease): Call your specialty clinic if you don't feel better in the next 2 days.    4. Know when to call 911: If your breathing is so bad that it keeps you from doing normal activities, call 911 or go to the emergency room. Tell them that you've been staying home and may have COVID-19.      Thank you for limiting contact with others, wearing a simple mask to cover your cough, practice good hand hygiene habits and accessing our virtual services where possible to limit the spread of this virus.    For more information about COVID19 and options for caring for yourself at home, please visit the CDC website at https://www.cdc.gov/coronavirus/2019-ncov/about/steps-when-sick.html  For more options for care at [facility], please visit our website at [email or web address]

## 2020-10-02 NOTE — LETTER
[facility]  [address]  [place] [address]  Phone: [phone]    October 2, 2020        Nessa Briceño  [address] [place] [id number]          To whom it may concern:    RE: Nessa Briceño    Patient was seen and treated today.  Please excuse from work until we have the results of COVID testing.  If results are negative recommend she not return to work until she is at least 10 days from symptom onset and symptoms are improving and she is fever free for 24 hours.      Please contact me for questions or concerns.      Sincerely,        Dane Martins PA-C

## 2020-10-05 ENCOUNTER — TELEPHONE (OUTPATIENT)
Dept: FAMILY MEDICINE | Facility: CLINIC | Age: 50
End: 2020-10-05

## 2020-10-05 NOTE — TELEPHONE ENCOUNTER
Bakari Szymanski,      I forwarded this to the care team and they will look in to it on Monday.      [facility] Scheduling   ----- Message -----   From: Nessa Briceño   Sent: 10/3/2020   8:22 PM CDT   To: Lalitha Alvares Reception Pool   Subject: Mallory                                                Topic: Procedural Question      I received a letter from my provider, I need a copy for my work. How do I manage that off this mallory? It has no option in this mallory to do so     Mailed to patient  Emmy Kat RN

## 2020-10-06 ENCOUNTER — AMBULATORY - HEALTHEAST (OUTPATIENT)
Dept: FAMILY MEDICINE | Facility: CLINIC | Age: 50
End: 2020-10-06

## 2020-10-06 DIAGNOSIS — R05.9 COUGH: ICD-10-CM

## 2020-10-08 ENCOUNTER — COMMUNICATION - HEALTHEAST (OUTPATIENT)
Dept: SCHEDULING | Facility: CLINIC | Age: 50
End: 2020-10-08

## 2020-10-08 ENCOUNTER — COMMUNICATION - HEALTHEAST (OUTPATIENT)
Dept: FAMILY MEDICINE | Facility: CLINIC | Age: 50
End: 2020-10-08

## 2021-06-12 NOTE — TELEPHONE ENCOUNTER
"Coronavirus (COVID-19) Notification    Caller Name (Patient, parent, daughter/sone, grandparent, etc)  Patient     Reason for call  Notify of Positive Coronavirus (COVID-19) lab results, assess symptoms,  review [facility] recommendations    Lab Result    Lab test:  2019-nCoV rRt-PCR or SARS-CoV-2 PCR    Oropharyngeal AND/OR nasopharyngeal swabs is POSITIVE for 2019-nCoV RNA/SARS-COV-2 PCR (COVID-19 virus)    RN Recommendations/Instructions per [facility] Coronavirus COVID-19 recommendations    Brief introduction script  Introduce self and then review script:  \"I am calling on behalf of [facility].  We were notified that your Coronavirus test (COVID-19) for was POSITIVE for the virus.  I have some information to relay to you but first I wanted to mention that the [place] Dept of Health will be contacting you shortly [it's possible MD already called Patient] to talk to you more about how you are feeling and other people you have had contact with who might now also have the virus.  Also, [facility] is Partnering with the [facility] for Covid-19 research, you may be contacted directly by research staff.\"    ssessment (Inquire about Patient's current symptoms)   Assessment   Current Symptoms at time of phone call: (if no symptoms, document No symptoms] \"Slight cough\"   Symptom onset (if applicable) 10/02/2020     If at time of call, Patients symptoms hare worsened, the Patient should contact 911 or have someone drive them to Emergency Dept promptly:      If Patient calling 911, inform 911 personal that you have tested positive for the Coronavirus (COVID-19).  Place mask on and await 911 to arrive.    If Emergency Dept, If possible, please have another adult drive you to the Emergency Dept but you need to wear mask when in contact with other people.      Review information with Patient    Your result was positive. This means you have COVID-19 (coronavirus).  We have sent you a letter that " reviews the information that I'll be reviewing with you now.    How can I protect others?    If you have symptoms: stay home and away from others (self-isolate) until:    You've had no fever--and no medicine that reduces fever--for 1 full day (24 hours). And      Your other symptoms have gotten better. For example, your cough or breathing has improved. And     At least 10 days have passed since your symptoms started. (If you ve been told by a doctor that you have a weak immune system, wait 20 days.)     If you don't have symptoms: Stay home and away from others (self-isolate) until at least 10 days have passed since your first positive COVID-19 test. (Date test collected).    During this time:    Stay in your own room, including for meals. Use your own bathroom if you can.    Stay away from others in your home. No hugging, kissing or shaking hands. No visitors.     Don't go to work, school or anywhere else.     Clean  high touch  surfaces often (doorknobs, counters, handles, etc.). Use a household cleaning spray or wipes. You'll find a full list on the EPA website at www.epa.gov/pesticide-registration/list-n-disinfectants-use-against-sars-cov-2.     Cover your mouth and nose with a mask, tissue or other face covering to avoid spreading germs.    Wash your hands and face often with soap and water.    Caregivers in these groups are at risk for severe illness due to COVID-19:  o People 65 years and older  o People who live in a nursing home or long-term care facility  o People with chronic disease (lung, heart, cancer, diabetes, kidney, liver, immunologic)  o People who have a weakened immune system, including those who:  - Are in cancer treatment  - Take medicine that weakens the immune system, such as corticosteroids  - Had a bone marrow or organ transplant  - Have an immune deficiency  - Have poorly controlled HIV or AIDS  - Are obese (body mass index of 40 or higher)  - Smoke regularly    Caregivers should wear  gloves while washing dishes, handling laundry and cleaning bedrooms and bathrooms.    Wash and dry laundry with special caution. Don't shake dirty laundry, and use the warmest water setting you can.    If you have a weakened immune system, ask your doctor about other actions you should take.    For more tips, go to www.cdc.gov/coronavirus/2019-ncov/downloads/10Things.pdf.    You should not go back to work until you meet the guidelines above for ending your home isolation. You don't need to be retested for COVID-19 before going back to work--studies show that you won't spread the virus if it's been at least 10 days since your symptoms started (or 20 days, if you have a weak immune system).    Employers: This document serves as formal notice of your employee's medical guidelines for going back to work. They must meet the above guidelines before going back to work in person.    How can I take care of myself?    1. Get lots of rest. Drink extra fluids (unless a doctor has told you not to).    2. Take Tylenol (acetaminophen) for fever or pain. If you have liver or kidney problems, ask your family doctor if it's okay to take Tylenol.     Take either:     650 mg (two 325 mg pills) every 4 to 6 hours, or     1,000 mg (two 500 mg pills) every 8 hours as needed.     Note: Don't take more than 3,000 mg in one day. Acetaminophen is found in many medicines (both prescribed and over-the-counter medicines). Read all labels to be sure you don't take too much.    For children, check the Tylenol bottle for the right dose (based on their age or weight).    3. If you have other health problems (like cancer, heart failure, an organ transplant or severe kidney disease): Call your specialty clinic if you don't feel better in the next 2 days.    4. Know when to call 911: Emergency warning signs include:    Trouble breathing or shortness of breath    Pain or pressure in the chest that doesn't go away    Feeling confused like you haven't  felt before, or not being able to wake up    Bluish-colored lips or face    5. Sign up for [employer]. We know it's scary to hear that you have COVID-19. We want to track your symptoms to make sure you're okay over the next 2 weeks. Please look for an email from [employer]--this is a free, online program that we'll use to keep in touch. To sign up, follow the link in the email. Learn more at [email or web address].    Where can I get more information?    [facility]: [email or web address]    Coronavirus Basics: www.health.[facility].[place]/diseases/coronavirus/basics.html    What to Do If You're Sick: www.cdc.gov/coronavirus/2019-ncov/about/steps-when-sick.html    Ending Home Isolation: www.cdc.gov/coronavirus/2019-ncov/hcp/disposition-in-home-patients.html     Caring for Someone with COVID-19: www.cdc.gov/coronavirus/2019-ncov/if-you-are-sick/care-for-someone.html     [facility] clinical trials (COVID-19 research studies): clinicalaffairs.[email or web address]/n-clinical-trials     A Positive COVID-19 letter will be sent via [employer] or the Mail.  (Exception, no letters sent to Presurgerical/Preprocedure Patients)    [Name]  Tay Ferguson LPN

## 2021-06-15 ENCOUNTER — E-VISIT (OUTPATIENT)
Dept: FAMILY MEDICINE | Facility: CLINIC | Age: 51
End: 2021-06-15
Payer: COMMERCIAL

## 2021-06-15 DIAGNOSIS — R42 VERTIGO: Primary | ICD-10-CM

## 2021-06-15 PROCEDURE — 99207 PR NON-BILLABLE SERV PER CHARTING: CPT | Performed by: FAMILY MEDICINE

## 2021-06-15 NOTE — TELEPHONE ENCOUNTER
Attempted to reach patient. Left message for her to return call for more information re: vertigo symptoms.  Monika SEVILLA RN

## 2021-06-15 NOTE — TELEPHONE ENCOUNTER
Provider E-Visit time total (minutes): 1    Please call and triage patient if she has never had this before needs a visit

## 2021-06-15 NOTE — TELEPHONE ENCOUNTER
"S-(situation): Patient attempted evisit for vertigo which started 06/14/21. Spoke to patient for more information.    B-(background): History of vertigo a year ago where room was spinning. This episode is worse.     A-(assessment): Room spinning sensation started yesterday morning when she got out of bed and happened a few times throughout the day. Was \"horrible\" when she went to bed. Every time she lays down and turns her head to the right she experiences room spinning and nausea without emesis. Symptoms much less with turning to left. Tried meclizine 50 mg today with no relief. Has seasonal allergies which have been improving over the past week. Has clear light nasal drainage and teary eyes. No fever. Slight headache relieved by tylenol or ibuprofen.  No chest pain, shortness of breath, diarrhea or bloody/black stools. No weakness on one side of body. Has baseline palpitations with no change. Was able to be upright doing dishes today.     R-(recommendations): Patient fearful to drive. Could do virtual visit which was scheduled for tomorrow at 5:20 PM.     Advised to be evaluated in Emergency room if :Vision or hearing changes  Call 911  Call 911, or get medical care right away if any of these occur:     Chest, arm, neck, back, or jaw pain    Weakness of an arm or leg or one side of the face    Blood in vomit or stool (black or red color)    Shortness of breath    Feeling that your heart is fluttering or beating fast or hard (palpitations) DIFFERENT THAN BASELINE    Passing out or seizure    Trouble walking or speaking  [employer] last reviewed this educational content on 2/1/2020 2000-2021 [employer]. All rights reserved. This information is not intended as a substitute for professional medical care. Always follow your healthcare professional's instructions  Monika SEVILLA RN    "

## 2021-06-15 NOTE — PATIENT INSTRUCTIONS
Patient Education     Dizziness (Uncertain Cause)  Dizziness is a common symptom. It may be described as lightheadedness, spinning, or feeling like you are going to faint. Dizziness can have many causes.   Tell the healthcare provider about:     All medicines you take, including prescription, over-the-counter, herbs, and supplements    Any other symptoms you have    Any health problems you are being treated for    Any past major health problems you've had, such as a heart attack, balance issues, hearing problems, or blood pressure problems    Anything that causes the dizziness to get worse or better  Today's exam did not show an exact cause for your dizziness . Other tests may be needed. Follow up with your healthcare provider.   Home care    Dizziness that occurs with sudden standing may be a sign of mild dehydration. Drink extra fluids for the next few days.    If you recently started a new medicine, stopped a medicine, or had the dose of a current medicine changed, talk with the prescribing healthcare provider. Your medicine plan may need adjustment.    If dizziness lasts more than a few seconds, sit or lie down until it passes. This may help prevent injury in case you pass out. Get up slowly when you feel better.    Don't drive or use power tools or dangerous equipment until you have had no dizziness for at least 48 hours.    Follow-up care  Follow up with your healthcare provider for further evaluation in the next 7 days, or as advised.   When to get medical advice  Call your healthcare provider for any of the following:     Worsening of symptoms or new symptoms    Repeated vomiting    Headache    Vision or hearing changes  Call 911  Call 911, or get medical care right away if any of these occur:     Chest, arm, neck, back, or jaw pain    Weakness of an arm or leg or one side of the face    Blood in vomit or stool (black or red color)    Shortness of breath    Feeling that your heart is fluttering or beating  fast or hard (palpitations)    Passing out or seizure    Trouble walking or speaking  [employer] last reviewed this educational content on 2/1/2020 2000-2021 [employer]. All rights reserved. This information is not intended as a substitute for professional medical care. Always follow your healthcare professional's instructions.

## 2021-06-16 ENCOUNTER — VIRTUAL VISIT (OUTPATIENT)
Dept: FAMILY MEDICINE | Facility: CLINIC | Age: 51
End: 2021-06-16
Payer: COMMERCIAL

## 2021-06-16 DIAGNOSIS — H81.13 BENIGN PAROXYSMAL POSITIONAL VERTIGO DUE TO BILATERAL VESTIBULAR DISORDER: Primary | ICD-10-CM

## 2021-06-16 PROCEDURE — 99214 OFFICE O/P EST MOD 30 MIN: CPT | Mod: GT | Performed by: FAMILY MEDICINE

## 2021-06-16 RX ORDER — MECLIZINE HYDROCHLORIDE 25 MG/1
25 TABLET ORAL 3 TIMES DAILY PRN
COMMUNITY
End: 2022-03-02

## 2021-06-16 NOTE — PROGRESS NOTES
"Nessa is a 51 year old who is being evaluated via a billable video visit.      How would you like to obtain your AVS? MyChart  If the video visit is dropped, the invitation should be resent by: Text to cell phone: [phone]  Will anyone else be joining your video visit? No    Video Start Time: 10:13 AM    Assessment & Plan     Benign paroxysmal positional vertigo due to bilateral vestibular disorder    - PHYSICAL THERAPY REFERRAL; Future             Patient Instructions   Continue meclizine    Will get you in to PT asap     Will have PT call you       Return in about 1 week (around 6/23/2021), or if symptoms worsen or fail to improve.    Mita Delgadillo MD  [facility]    Subjective   Nessa is a 51 year old who presents for the following health issues     HPI     Concern - vertigo   Onset: 3 days   Description: Room spinning sensation started yesterday morning when she got out of bed and happened a few times throughout the day. Was \"horrible\" when she went to bed. Every time she lays down and turns her head to the right she experiences room spinning and nausea without emesis. Symptoms much less with turning to left. Tried meclizine 50 mg today with no relief. Has seasonal allergies which have been improving over the past week. Has clear light nasal drainage and teary eyes. No fever. Slight headache relieved by tylenol or ibuprofen.  No chest pain, shortness of breath, diarrhea or bloody/black stools. No weakness on one side of body. Has baseline palpitations with no change. Was able to be upright doing dishes today.   Intensity: moderate  Progression of Symptoms:  same  Previous history of similar problem: once in the past but this episode feels different   Therapies tried and outcome: meclizine 25 mg took 2 tablets-make her tried         Review of Systems   Constitutional, HEENT, cardiovascular, pulmonary, gi and gu systems are negative, except as otherwise noted.      Objective  "          Vitals:  No vitals were obtained today due to virtual visit.    Physical Exam   GENERAL: Healthy, alert and no distress  EYES: Eyes grossly normal to inspection.  No discharge or erythema, or obvious scleral/conjunctival abnormalities.  RESP: No audible wheeze, cough, or visible cyanosis.  No visible retractions or increased work of breathing.    SKIN: Visible skin clear. No significant rash, abnormal pigmentation or lesions.  NEURO: Cranial nerves grossly intact.  Mentation and speech appropriate for age.  PSYCH: Mentation appears normal, affect normal/bright, judgement and insight intact, normal speech and appearance well-groomed.                Video-Visit Details    Type of service:  Video Visit    Video End Time:10:20 AM    Originating Location (pt. Location): Home    Distant Location (provider location):  [facility]     Platform used for Video Visit: Indie Vinos

## 2021-06-16 NOTE — LETTER
My Asthma Action Plan    Name: Nessa Briceño   YOB: 1970  Date: 6/16/2021   My doctor: Mita Delgadillo MD   My clinic: [facility]        My Rescue Medicine:   Albuterol inhaler (Proair/Ventolin/Proventil HFA)  2-4 puffs EVERY 4 HOURS as needed. Use a spacer if recommended by your provider.   My Asthma Severity:   Intermittent / Exercise Induced  Know your asthma triggers: upper respiratory infections  None          GREEN ZONE   Good Control    I feel good    No cough or wheeze    Can work, sleep and play without asthma symptoms       Take your asthma control medicine every day.     1. If exercise triggers your asthma, take your rescue medication    15 minutes before exercise or sports, and    During exercise if you have asthma symptoms  2. Spacer to use with inhaler: If you have a spacer, make sure to use it with your inhaler             YELLOW ZONE Getting Worse  I have ANY of these:    I do not feel good    Cough or wheeze    Chest feels tight    Wake up at night   1. Keep taking your Green Zone medications  2. Start taking your rescue medicine:    every 20 minutes for up to 1 hour. Then every 4 hours for 24-48 hours.  3. If you stay in the Yellow Zone for more than 12-24 hours, contact your doctor.  4. If you do not return to the Green Zone in 12-24 hours or you get worse, start taking your oral steroid medicine if prescribed by your provider.           RED ZONE Medical Alert - Get Help  I have ANY of these:    I feel awful    Medicine is not helping    Breathing getting harder    Trouble walking or talking    Nose opens wide to breathe       1. Take your rescue medicine NOW  2. If your provider has prescribed an oral steroid medicine, start taking it NOW  3. Call your doctor NOW  4. If you are still in the Red Zone after 20 minutes and you have not reached your doctor:    Take your rescue medicine again and    Call 911 or go to the emergency room right  away    See your regular doctor within 2 weeks of an Emergency Room or Urgent Care visit for follow-up treatment.          Annual Reminders:  Meet with Asthma Educator,  Flu Shot in the Fall, consider Pneumonia Vaccination for patients with asthma (aged 19 and older).    Pharmacy:    [id number] - [place], [place] - [address]  [facility] [address]    Electronically signed by Mita Delgadillo MD   Date: 06/16/21                    Asthma Triggers  How To Control Things That Make Your Asthma Worse    Triggers are things that make your asthma worse.  Look at the list below to help you find your triggers and   what you can do about them. You can help prevent asthma flare-ups by staying away from your triggers.      Trigger                                                          What you can do   Cigarette Smoke  Tobacco smoke can make asthma worse. Do not allow smoking in your home, car or around you.  Be sure no one smokes at a child s day care or school.  If you smoke, ask your health care provider for ways to help you quit.  Ask family members to quit too.  Ask your health care provider for a referral to Quit Plan to help you quit smoking, or call 1-379-902-PLAN.     Colds, Flu, Bronchitis  These are common triggers of asthma. Wash your hands often.  Don t touch your eyes, nose or mouth.  Get a flu shot every year.     Dust Mites  These are tiny bugs that live in cloth or carpet. They are too small to see. Wash sheets and blankets in hot water every week.   Encase pillows and mattress in dust mite proof covers.  Avoid having carpet if you can. If you have carpet, vacuum weekly.   Use a dust mask and HEPA vacuum.   Pollen and Outdoor Mold  Some people are allergic to trees, grass, or weed pollen, or molds. Try to keep your windows closed.  Limit time out doors when pollen count is high.   Ask you health care provider about taking medicine  during allergy season.     Animal Dander  Some people are allergic to skin flakes, urine or saliva from pets with fur or feathers. Keep pets with fur or feathers out of your home.    If you can t keep the pet outdoors, then keep the pet out of your bedroom.  Keep the bedroom door closed.  Keep pets off cloth furniture and away from stuffed toys.     Mice, Rats, and Cockroaches  Some people are allergic to the waste from these pests.   Cover food and garbage.  Clean up spills and food crumbs.  Store grease in the refrigerator.   Keep food out of the bedroom.   Indoor Mold  This can be a trigger if your home has high moisture. Fix leaking faucets, pipes, or other sources of water.   Clean moldy surfaces.  Dehumidify basement if it is damp and smelly.   Smoke, Strong Odors, and Sprays  These can reduce air quality. Stay away from strong odors and sprays, such as perfume, powder, hair spray, paints, smoke incense, paint, cleaning products, candles and new carpet.   Exercise or Sports  Some people with asthma have this trigger. Be active!  Ask your doctor about taking medicine before sports or exercise to prevent symptoms.    Warm up for 5-10 minutes before and after sports or exercise.     Other Triggers of Asthma  Cold air:  Cover your nose and mouth with a scarf.  Sometimes laughing or crying can be a trigger.  Some medicines and food can trigger asthma.

## 2021-06-17 ASSESSMENT — ASTHMA QUESTIONNAIRES: ACT_TOTALSCORE: 25

## 2021-11-03 ENCOUNTER — VIRTUAL VISIT (OUTPATIENT)
Dept: FAMILY MEDICINE | Facility: CLINIC | Age: 51
End: 2021-11-03
Payer: COMMERCIAL

## 2021-11-03 DIAGNOSIS — J01.90 ACUTE SINUSITIS WITH SYMPTOMS > 10 DAYS: ICD-10-CM

## 2021-11-03 DIAGNOSIS — J45.901 EXACERBATION OF ASTHMA, UNSPECIFIED ASTHMA SEVERITY, UNSPECIFIED WHETHER PERSISTENT: Primary | ICD-10-CM

## 2021-11-03 PROCEDURE — 99214 OFFICE O/P EST MOD 30 MIN: CPT | Mod: GT | Performed by: PHYSICIAN ASSISTANT

## 2021-11-03 RX ORDER — PREDNISONE 20 MG/1
20 TABLET ORAL DAILY
Qty: 5 TABLET | Refills: 0 | Status: SHIPPED | OUTPATIENT
Start: 2021-11-03 | End: 2021-11-17

## 2021-11-03 NOTE — LETTER
November 3, 2021      Nessa Briceño  [address] [place] [id number]        To Whom It May Concern:    Nessa Briceño  was seen on 11/3/21.  Please excuse her from work for the remainder of this week due to illness.  She can return to work next week with no restrictions.        Sincerely,        Anamaria Hutton PA-C

## 2021-11-03 NOTE — PROGRESS NOTES
Nessa is a 51 year old who is being evaluated via a billable video visit.      How would you like to obtain your AVS? MyChart  If the video visit is dropped, the invitation should be resent by: Text to cell phone: [phone]  Will anyone else be joining your video visit? No    Video Start Time: 10:05 AM    Assessment & Plan   Acute sinusitis with symptoms > 10 days  treat  - amoxicillin-clavulanate (AUGMENTIN) 875-125 MG tablet; Take 1 tablet by mouth 2 times daily    Exacerbation of asthma, unspecified asthma severity, unspecified whether persistent  treat  - predniSONE (DELTASONE) 20 MG tablet; Take 1 tablet (20 mg) by mouth daily      Patient Instructions   meds   Work note        No follow-ups on file.    Anamaria Hutton PA-C  [facility]    Subjective   Nessa is a 51 year old who presents for the following health issues     HPI     Acute Illness  Acute illness concerns: Cough. Tested negative for COVID 10/29/2021.  Onset/Duration: x 10 days  Symptoms:  Fever: YES- low grade 100  Chills/Sweats: YES  Headache (location?): YES  Sinus Pressure: YES  Conjunctivitis:  no  Ear Pain: YES: bilateral- crackling and pressure. Sounds like popcorn.  Rhinorrhea: YES  Congestion: YES  Sore Throat: YES  Cough: YES-productive of green sputum, with shortness of breath. Patient states she feels like she has bronchitis. She use to get it all the time, but not for past 5 yrs - usually resolved faster.  Wheeze: no  Decreased Appetite: YES  Nausea: no  Vomiting: no. Only when she has a cough attack.  Diarrhea: not anymore but did last week.  Dysuria/Freq.: no  Dysuria or Hematuria: no  Fatigue/Achiness: YES- both  Sick/Strep Exposure: YES- Grand kids were sick but not COVID. Her  got sick after her with same as her.  Therapies tried and outcome: Nyquil and Day quil- help a little bit.    Coughing fits, will cough until gag and throw up.  Worse at night.  Has to sleep upright.  Runny stuffy  nose.  Started in sinuses then also went to chest.  Mucus has become colored at times slightly blood tinged.  Year round allergies the past few yrs.  Has had to use her inhaler a few times with this illness.  Works as a nurse.  Amoxicillin has never worked for her.        Objective           Vitals:  No vitals were obtained today due to virtual visit.          Video-Visit Details    Type of service:  Video Visit    Video End Time:10:21 AM    Originating Location (pt. Location): Home    Distant Location (provider location):  [facility]     Platform used for Video Visit: AmWell   Converted to phone after amwell and francia being difficult to hear

## 2021-11-17 ENCOUNTER — ANCILLARY PROCEDURE (OUTPATIENT)
Dept: GENERAL RADIOLOGY | Facility: CLINIC | Age: 51
End: 2021-11-17
Attending: PHYSICIAN ASSISTANT
Payer: COMMERCIAL

## 2021-11-17 ENCOUNTER — VIRTUAL VISIT (OUTPATIENT)
Dept: FAMILY MEDICINE | Facility: CLINIC | Age: 51
End: 2021-11-17
Payer: COMMERCIAL

## 2021-11-17 DIAGNOSIS — R05.8 PRODUCTIVE COUGH: Primary | ICD-10-CM

## 2021-11-17 DIAGNOSIS — R05.8 PRODUCTIVE COUGH: ICD-10-CM

## 2021-11-17 PROCEDURE — 71046 X-RAY EXAM CHEST 2 VIEWS: CPT | Performed by: RADIOLOGY

## 2021-11-17 PROCEDURE — 99213 OFFICE O/P EST LOW 20 MIN: CPT | Mod: GT | Performed by: PHYSICIAN ASSISTANT

## 2021-11-17 NOTE — LETTER
[facility]  [address] [place] [id number]  Phone: [phone]  Fax: [phone]    11/17/21    Nessa PALOMINO Keyanna  [address] [place] [id number]      To whom it may concern:     Nessa was evaluated by the clinic today.  She will be off work today due to medical condition but can return to work tomorrow without restrictions if she feels able.    Sincerely,      Anamaria Hutton PA-C

## 2021-11-17 NOTE — PROGRESS NOTES
Nessa is a 51 year old who is being evaluated via a billable video visit.      How would you like to obtain your AVS? MyChart  If the video visit is dropped, the invitation should be resent by: Text to cell phone: [phone]  Will anyone else be joining your video visit? No    Video Start Time: 10:12 AM    Assessment & Plan     Productive cough  treat  - XR Chest 2 Views; Future  - predniSONE (DELTASONE) 20 MG tablet; Take 2 tablets (40 mg) by mouth daily  - azithromycin (ZITHROMAX) 250 MG tablet; Take 2 tablets (500 mg) by mouth daily for 1 day, THEN 1 tablet (250 mg) daily for 4 days.    Patient Instructions   Chest xray  meds        Return in about 2 weeks (around 12/1/2021), or if symptoms worsen or fail to improve.    Anamaria Hutton PA-C  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  headache and pressure, nasal congestion are much better.  But feels like it has settled to chest.  Coughing, ST, hoarseness, chest muscles sore from cough, still having to sleep sitting up.  Low grade fever/99s that comes and goes.  No more blood tinge but cough very productive.  Cough same day and night, cough spells until she pukes.  Albuterol not helping much.  Days 3-5 of prednisone felt better, but went back downhill.  Works as RN.           Objective           Vitals:  No vitals were obtained today due to virtual visit.          Video-Visit Details    Type of service:  Video Visit    Video End Time:10:26    Originating Location (pt. Location): Home    Distant Location (provider location):  [facility]     Platform used for Video Visit: JasminWell

## 2021-11-18 RX ORDER — PREDNISONE 20 MG/1
40 TABLET ORAL DAILY
Qty: 10 TABLET | Refills: 0 | Status: SHIPPED | OUTPATIENT
Start: 2021-11-18 | End: 2022-03-02

## 2021-11-18 RX ORDER — AZITHROMYCIN 250 MG/1
TABLET, FILM COATED ORAL
Qty: 6 TABLET | Refills: 0 | Status: SHIPPED | OUTPATIENT
Start: 2021-11-18 | End: 2021-11-23

## 2022-02-24 ENCOUNTER — TELEPHONE (OUTPATIENT)
Dept: FAMILY MEDICINE | Facility: CLINIC | Age: 52
End: 2022-02-24
Payer: COMMERCIAL

## 2022-03-02 ENCOUNTER — OFFICE VISIT (OUTPATIENT)
Dept: FAMILY MEDICINE | Facility: CLINIC | Age: 52
End: 2022-03-02
Payer: COMMERCIAL

## 2022-03-02 VITALS
BODY MASS INDEX: 27.79 KG/M2 | DIASTOLIC BLOOD PRESSURE: 82 MMHG | TEMPERATURE: 97.5 F | WEIGHT: 151 LBS | SYSTOLIC BLOOD PRESSURE: 122 MMHG | HEART RATE: 90 BPM | RESPIRATION RATE: 22 BRPM | HEIGHT: 62 IN | OXYGEN SATURATION: 96 %

## 2022-03-02 DIAGNOSIS — Z12.11 SCREEN FOR COLON CANCER: ICD-10-CM

## 2022-03-02 DIAGNOSIS — Z11.4 ENCOUNTER FOR SCREENING FOR HIV: ICD-10-CM

## 2022-03-02 DIAGNOSIS — M79.674 PAIN OF TOE OF RIGHT FOOT: ICD-10-CM

## 2022-03-02 DIAGNOSIS — Z12.31 VISIT FOR SCREENING MAMMOGRAM: ICD-10-CM

## 2022-03-02 DIAGNOSIS — Z11.4 SCREENING FOR HIV (HUMAN IMMUNODEFICIENCY VIRUS): ICD-10-CM

## 2022-03-02 DIAGNOSIS — R00.2 PALPITATIONS: ICD-10-CM

## 2022-03-02 DIAGNOSIS — M25.50 MULTIPLE JOINT PAIN: Primary | ICD-10-CM

## 2022-03-02 LAB
ANION GAP SERPL CALCULATED.3IONS-SCNC: 2 MMOL/L (ref 3–14)
BUN SERPL-MCNC: 19 MG/DL (ref 7–30)
CALCIUM SERPL-MCNC: 9.1 MG/DL (ref 8.5–10.1)
CHLORIDE BLD-SCNC: 108 MMOL/L (ref 94–109)
CO2 SERPL-SCNC: 28 MMOL/L (ref 20–32)
CREAT SERPL-MCNC: 0.64 MG/DL (ref 0.52–1.04)
ERYTHROCYTE [DISTWIDTH] IN BLOOD BY AUTOMATED COUNT: 11.8 % (ref 10–15)
GFR SERPL CREATININE-BSD FRML MDRD: >90 ML/MIN/1.73M2
GLUCOSE BLD-MCNC: 94 MG/DL (ref 70–99)
HBA1C MFR BLD: 5.5 % (ref 0–5.6)
HCT VFR BLD AUTO: 42 % (ref 35–47)
HGB BLD-MCNC: 13.9 G/DL (ref 11.7–15.7)
MCH RBC QN AUTO: 30.7 PG (ref 26.5–33)
MCHC RBC AUTO-ENTMCNC: 33.1 G/DL (ref 31.5–36.5)
MCV RBC AUTO: 93 FL (ref 78–100)
PLATELET # BLD AUTO: 306 10E3/UL (ref 150–450)
POTASSIUM BLD-SCNC: 4.3 MMOL/L (ref 3.4–5.3)
RBC # BLD AUTO: 4.53 10E6/UL (ref 3.8–5.2)
SODIUM SERPL-SCNC: 138 MMOL/L (ref 133–144)
T4 FREE SERPL-MCNC: 1.4 NG/DL (ref 0.76–1.46)
TSH SERPL DL<=0.005 MIU/L-ACNC: 0.01 MU/L (ref 0.4–4)
WBC # BLD AUTO: 5.7 10E3/UL (ref 4–11)

## 2022-03-02 PROCEDURE — 87389 HIV-1 AG W/HIV-1&-2 AB AG IA: CPT | Performed by: FAMILY MEDICINE

## 2022-03-02 PROCEDURE — 85027 COMPLETE CBC AUTOMATED: CPT | Performed by: FAMILY MEDICINE

## 2022-03-02 PROCEDURE — 83036 HEMOGLOBIN GLYCOSYLATED A1C: CPT | Performed by: FAMILY MEDICINE

## 2022-03-02 PROCEDURE — 99214 OFFICE O/P EST MOD 30 MIN: CPT | Performed by: FAMILY MEDICINE

## 2022-03-02 PROCEDURE — 84443 ASSAY THYROID STIM HORMONE: CPT | Performed by: FAMILY MEDICINE

## 2022-03-02 PROCEDURE — 36415 COLL VENOUS BLD VENIPUNCTURE: CPT | Performed by: FAMILY MEDICINE

## 2022-03-02 PROCEDURE — 84439 ASSAY OF FREE THYROXINE: CPT | Performed by: FAMILY MEDICINE

## 2022-03-02 PROCEDURE — 80048 BASIC METABOLIC PNL TOTAL CA: CPT | Performed by: FAMILY MEDICINE

## 2022-03-02 ASSESSMENT — PAIN SCALES - GENERAL: PAINLEVEL: MODERATE PAIN (4)

## 2022-03-02 NOTE — PROGRESS NOTES
Assessment & Plan     Multiple joint pain  Experiencing multiple joint aches and pains for quite some time, history of inflammatory arthritis.  Physical examination as described.  Recommended well hydration, over-the-counter analgesia and rheumatology referral placed  - Adult Rheumatology  Referral; Future    Visit for screening mammogram  - MA SCREENING DIGITAL BILAT - Future  (s+30); Future    Screen for colon cancer  - Adult Gastro Ref - Procedure Only; Future    Screening for HIV (human immunodeficiency virus)  - HIV Antigen Antibody Combo; Future  - HIV Antigen Antibody Combo      Palpitations  Symptoms are nonspecific and differential are broad including cardiovascular.  CBC, BMP, hemoglobin A1c, TSH, echocardiogram and Zio patch ordered for further evaluation.  Home care discussed and suggested well hydration and to limit excessive caffeine/soda  - CBC with platelets; Future  - Basic metabolic panel  (Ca, Cl, CO2, Creat, Gluc, K, Na, BUN); Future  - Hemoglobin A1c; Future  - TSH with free T4 reflex; Future  - Leadless EKG Monitor 8 to 14 Days; Future  - Echocardiogram Complete; Future  - CBC with platelets  - Basic metabolic panel  (Ca, Cl, CO2, Creat, Gluc, K, Na, BUN)  - Hemoglobin A1c  - TSH with free T4 reflex    Pain of toe of right foot  Small callus involving right fourth and fifth toe.  Podiatry referral placed  - Orthopedic  Referral; Future       Dinesh Blaek MD  [facility]    David Szymanski is a 51 year old who presents for the following health issues     Heart Problem    Arthritis  This is a chronic problem. The current episode started more than 1 year ago. The problem occurs constantly. The problem has been gradually worsening.   History of Present Illness       Migraines:   Since the patient's last clinic visit, headaches are: worsened  The patient is getting headaches:  Daily  She is able to do normal daily activities when she has a  "migraine.  The patient is taking the following rescue/relief medications:  Ibuprofen (Advil, Motrin) and Tylenol   Patient states \"I get some relief\" from the rescue/relief medications.   The patient is taking the following medications to prevent migraines:  No medications to prevent migraines  In the past 4 weeks, the patient has gone to an Urgent Care or Emergency Room 0 times times due to headaches.    Vascular Disease:  She presents for follow up of vascular disease.  She never takes nitroglycerin. She is not taking daily aspirin.  Reason for visit:  Palpatations, HA, pain, foot  Symptom onset:  1-2 weeks ago  Symptoms include:  Palpatations, SOB, HA, joint/muscle pain arthritic, foot pain/corn  Symptom intensity:  Moderate  Symptom progression:  Worsening  Had these symptoms before:  Yes  Has tried/received treatment for these symptoms:  No  What makes it worse:  Depends on the situation  What makes it better:  Not really    She eats 2-3 servings of fruits and vegetables daily.She consumes 2 sweetened beverage(s) daily.She exercises with enough effort to increase her heart rate 30 to 60 minutes per day.  She exercises with enough effort to increase her heart rate 4 days per week. She is missing 2 dose(s) of medications per week.  She is not taking prescribed medications regularly due to remembering to take.       Constitutional, HEENT, cardiovascular, pulmonary, GI, , musculoskeletal, neuro, skin, endocrine and psych systems are negative, except as otherwise noted.      Objective    /82 (BP Location: Right arm, Patient Position: Sitting, Cuff Size: Adult Regular)   Pulse 90   Temp 97.5  F (36.4  C) (Tympanic)   Resp 22   Ht 1.575 m (5' 2\")   Wt 68.5 kg (151 lb)   LMP 09/28/2011   SpO2 96%   Breastfeeding No   BMI 27.62 kg/m    Body mass index is 27.62 kg/m .  Physical Exam   GENERAL: alert, no distress and over weight  EYES: Eyes grossly normal to inspection, PERRL and conjunctivae and sclerae " normal  HENT: normal cephalic/atraumatic, nose and mouth without ulcers or lesions, oropharynx clear and oral mucous membranes moist  NECK: no adenopathy, no asymmetry, masses, or scars and thyroid normal to palpation  RESP: lungs clear to auscultation - no rales, rhonchi or wheezes  CV: regular rate and rhythm, normal S1 S2, no S3 or S4, no murmur, click or rub, no peripheral edema and peripheral pulses strong  ABDOMEN: soft, nontender, no hepatosplenomegaly, no masses and bowel sounds normal  MS: No joint swelling or skin discoloration noted, range of movement normal, small callus in between right fourth and fifth toe  SKIN: no suspicious lesions noted  NEURO: Normal strength and tone, mentation intact and speech normal  PSYCH: mentation appears normal, affect normal/bright

## 2022-03-03 ENCOUNTER — TELEPHONE (OUTPATIENT)
Dept: ENDOCRINOLOGY | Facility: CLINIC | Age: 52
End: 2022-03-03
Payer: COMMERCIAL

## 2022-03-03 DIAGNOSIS — R79.89 LOW TSH LEVEL: Primary | ICD-10-CM

## 2022-03-03 LAB — HIV 1+2 AB+HIV1 P24 AG SERPL QL IA: NONREACTIVE

## 2022-03-03 NOTE — TELEPHONE ENCOUNTER
[facility] Call Center    Phone Message    May a detailed message be left on voicemail: yes     Reason for Call: Appointment Intake    Referring Provider Name: Dr. Dinesh Blake  Diagnosis and/or Symptoms: Low TSH level     This referral came over as Urgent, pt to be seen within 3-5 days.    Pt declined video visit at any other location and only would like [facility].  Pt declined scheduling the next available and asked me to send a message to the clinic.  It was explained to the pt that I can schedule her and put her on the wait list and pt declined.    Action Taken: Message routed to:  [facility] Endo    Travel Screening: Not Applicable

## 2022-03-03 NOTE — TELEPHONE ENCOUNTER
LMTCB to discuss Per , First available NP appointment is okay. In meantime can forward to referring provider:     Recommend Her provider can check total T3, TSI, and TRAb in the meantime. If symptomatic can use a beta blocker.     If her provider has questions in the interim they can always call the Endocrine advice line and talk to the on call provider.

## 2022-03-03 NOTE — TELEPHONE ENCOUNTER
Component      Latest Ref Rng & Units 3/2/2022          11:50 AM   TSH      0.40 - 4.00 mU/L 0.01 (L)       Please advise as referral marked urgent 3-5 days.

## 2022-03-04 ENCOUNTER — TELEPHONE (OUTPATIENT)
Dept: FAMILY MEDICINE | Facility: CLINIC | Age: 52
End: 2022-03-04

## 2022-03-04 ENCOUNTER — LAB (OUTPATIENT)
Dept: LAB | Facility: CLINIC | Age: 52
End: 2022-03-04
Payer: COMMERCIAL

## 2022-03-04 ENCOUNTER — HOSPITAL ENCOUNTER (OUTPATIENT)
Dept: MAMMOGRAPHY | Facility: CLINIC | Age: 52
Discharge: HOME | End: 2022-03-04
Attending: FAMILY MEDICINE | Admitting: FAMILY MEDICINE
Payer: COMMERCIAL

## 2022-03-04 DIAGNOSIS — R79.89 LOW TSH LEVEL: Primary | ICD-10-CM

## 2022-03-04 DIAGNOSIS — Z12.31 VISIT FOR SCREENING MAMMOGRAM: ICD-10-CM

## 2022-03-04 DIAGNOSIS — R79.89 LOW TSH LEVEL: ICD-10-CM

## 2022-03-04 LAB
T3 SERPL-MCNC: 164 NG/DL (ref 60–181)
T3FREE SERPL-MCNC: 4.2 PG/ML (ref 2.3–4.2)

## 2022-03-04 PROCEDURE — 84481 FREE ASSAY (FT-3): CPT

## 2022-03-04 PROCEDURE — 84445 ASSAY OF TSI GLOBULIN: CPT | Mod: 90

## 2022-03-04 PROCEDURE — 77067 SCR MAMMO BI INCL CAD: CPT

## 2022-03-04 PROCEDURE — 99000 SPECIMEN HANDLING OFFICE-LAB: CPT

## 2022-03-04 PROCEDURE — 36415 COLL VENOUS BLD VENIPUNCTURE: CPT

## 2022-03-04 NOTE — TELEPHONE ENCOUNTER
Good morning Nessa,    I have not been contacted by endocrinology team.  T3 and TSI ordered.    Dr Blake

## 2022-03-04 NOTE — TELEPHONE ENCOUNTER
Reason for Call:  Endocrinology Referral     Detailed comments: Appt is July - Endcrinology  Endocrinology recommended  T3, TSI & Pt not sure what other labs they wanted. They told her that they reached out to Dr. Blake -  Please Advise.   Dr. Blake had they talked to you? Do you want me to call down to find out what labs are needed?    Did Schedule lab appt today at 4 pm for pt.       Phone Number Patient can be reached at: Home number on file [phone] (home)    Best Time: Any Time      Can we leave a detailed message on this number? YES    Call taken on 3/4/2022 at 10:33 AM by Kelly Vallejo

## 2022-03-04 NOTE — TELEPHONE ENCOUNTER
Hello! Please help forward to Dr Blake and their team!    Please review previous message from our endocrine team. Thanks!

## 2022-03-09 ENCOUNTER — OFFICE VISIT (OUTPATIENT)
Dept: PODIATRY | Facility: CLINIC | Age: 52
End: 2022-03-09
Payer: COMMERCIAL

## 2022-03-09 VITALS
HEART RATE: 81 BPM | WEIGHT: 151 LBS | SYSTOLIC BLOOD PRESSURE: 130 MMHG | HEIGHT: 62 IN | DIASTOLIC BLOOD PRESSURE: 85 MMHG | BODY MASS INDEX: 27.79 KG/M2

## 2022-03-09 DIAGNOSIS — L84 CALLUS OF FOOT: Primary | ICD-10-CM

## 2022-03-09 DIAGNOSIS — R79.89 LOW TSH LEVEL: Primary | ICD-10-CM

## 2022-03-09 DIAGNOSIS — M79.674 PAIN OF TOE OF RIGHT FOOT: ICD-10-CM

## 2022-03-09 LAB — TSI SER-ACNC: <1 TSI INDEX

## 2022-03-09 PROCEDURE — 99203 OFFICE O/P NEW LOW 30 MIN: CPT | Performed by: PODIATRIST

## 2022-03-09 NOTE — PATIENT INSTRUCTIONS
Calluses of the Foot    I.  Calluses on the toes   A.  Tips of the toes    1.  Due to pressure on the tips of the toes when wearing shoes, sandals or barefoot.    2.  Related to hammertoe deformity of the toes.    3.  Treated with wearing soft cushioned toe caps or shoe liners to better offload the pressure to the tips of the toes    4.  Correcting the deformity of the toe is another option if cushions do not help   B.  Top of sides of the knuckles of the toes    1.  Due to pressure from adjacent toes or shoes on the knuckles of the toes.    2.  Can be related to hammertoe deformities    3.  Treated with wearing roomy shoes with soft top-cover material in order not to rub on the skin    4.  Silicone toe caps can also be used to protect rubbing from the knuckles of the adjacent toes.    5.  Correcting the deformity of the toe is another option if offloading measures do not work.  II. Calluses on the bottom of the foot   A.   Pressure points    1.  Caused by direct pressure overlying prominent bones such as metatarsal head on the balls of the foot.    2.  Can be related to either hammertoe deformities or fat pad atrophy    3.  Can be treated with additional cushion utilizing silicone shoe liners to better offload the pressure and supplement for fat pad atrophy.    4.  Surgical correction of hammertoe deformities is another option if offloading cushion treatment does not work.   B.   Shear forces    1.  Caused by sliding forces along the skin on the bottom of the forefoot including the great toe.    2.  This is not due to a rotational force as the foot pushes off against the ground.    3.  Treated with wearing a silicone shoe liner that can move with the skin reducing the amount of shear force causing the callus formation.   C.   IPK lesions or porokeratosis    1.  These are small hard callus lesions that are related to plugged sweat ducts.    2.  These ducts travel through the epidermis and dermis residing in the  subcutaneous tissue    3.  When the ducts get clogged, they can harden up resulting in callused skin around them.      4.  Treatment includes sharp excavation of the hyperkeratotic callus tissue core as far as can be tolerated, preferably without bleeding.    5.   Other treatment options   A.  Application of salicylic acid to soften the hyperkeratotic skin   B.  Cantharone which causes a blister in attempt to pull off the hyperkeratotic skin lesion.    Helpful products:    Soles  Silicone Shoe Inserts    Amazon: https://www.Animated Speech/Soles-Silicone-Orthopedic-Comfortable-Hypoallergenic/dp/B92UROD6RO/ref=sr_1_2?dchild=1&keywords=silicone+sole&tzd=[email or web address]&sr=8-2    Dr. Garcia's Gels Toe Caps      Sold at [employer] in [facility]    Amazon: https://www.Animated Speech/Pete-All-Mini-Size/dp/Y92TZFWM31/ref=sr_1_2?crid=[email or web address]&dchild=1&keywords=+marianne+all+gel+toe+cap&jto=[id number]&sprefix=+Radha%27s+gels+toe%2Caps%2C171&sr=8-2

## 2022-03-09 NOTE — LETTER
3/9/2022         RE: Nessa Briceño  [address] [facility] [address]        Dear Colleague,    Thank you for referring your patient, Nessa Briceño, to the [facility] [place]. Please see a copy of my visit note below.    PATIENT HISTORY:  Nessa Briceño is a 51 year old female who presents to clinic in consultation at the request of  Dinesh Blake M.D. with a chief complaint of corn on foot.  The patient is seen with her boyfriend.  The patient relates the pain is primarily located around the between the fifth and fourth digit.  Reports insidious onset without acute precipitating event. that has been going on for 1 year(s).  The patient has previously tried everything over the counter with little relief.  Denies any prior history of similar issues..  The patient is currently employed as RN.  Any previous notes and studies that pertain to the patient's condition were reviewed.    Pertinent medical, surgical and family history was reviewed in the Epic chart.    Past Medical History:   Past Medical History:   Diagnosis Date     Abnormal glandular Papanicolaou smear of cervix        Medications:   Current Outpatient Medications:      albuterol (PROAIR HFA/PROVENTIL HFA/VENTOLIN HFA) 108 (90 Base) MCG/ACT inhaler, Inhale 2 puffs into the lungs every 6 hours as needed for shortness of breath / dyspnea or wheezing, Disp: 18 g, Rfl: 1     hydrochlorothiazide (HYDRODIURIL) 25 MG tablet, Take 1 tablet (25 mg) by mouth daily, Disp: 90 tablet, Rfl: 2     IBUPROFEN 600 MG OR TABS, , Disp: , Rfl:      omeprazole (PRILOSEC) 20 MG DR capsule, Take 1 capsule (20 mg) by mouth as needed, Disp: 90 capsule, Rfl: 3     ALBUTEROL SULFATE (2.5 MG/3ML) 0.083% IN NEBU, every 4 hours prn (Patient not taking: Reported on 11/17/2021), Disp: 1 Each, Rfl: 3     Allergies:    Allergies   Allergen Reactions     Oxycodone-Acetaminophen Other (See Comments)     Hallucinations       Vitals: /85   Pulse 81   " Ht 1.575 m (5' 2\")   Wt 68.5 kg (151 lb)   LMP 09/28/2011   BMI 27.62 kg/m    BMI= Body mass index is 27.62 kg/m .    LOWER EXTREMITY PHYSICAL EXAM    Dermatologic: Noted hyperkeratotic lesions on the medial aspect of the distal fifth toe and lateral aspect of the proximal interphalangeal joint of the fourth toe.  Otherwise skin is intact to right lower extremity without significant lesions, rash or abrasion.        Vascular: DP & PT pulses are intact & regular on the right.   CFT and skin temperature is normal to the right lower extremity.     Neurologic: Lower extremity sensation is intact to light touch.  No evidence of weakness in the right lower extremity.        Musculoskeletal: Patient is ambulatory without assistive device or brace.  No gross ankle deformity noted.  No foot or ankle joint effusion is noted.  Noted adductovarus positioning of the fifth toe on the right.            ASSESSMENT / PLAN:     ICD-10-CM    1. Callus of foot  L84    2. Pain of toe of right foot  M79.674 Orthopedic  Referral       I have explained to Nessa about the conditions.  We discussed the underlying contributing factors to the condition as well as treatment options along with expected length of recovery.  At this time, patient was advised to wear a silicone toe sleeve over the fourth toe to help offload the pressure causing the skin lesions to develop.  The patient was advised that if this does not help surgical resection of the fourth toe condyle may help offload the pressure as well.  The patient will return to the office if problems persist.    Nessa verbalized agreement with and understanding of the rational for the diagnosis and treatment plan.  All questions were answered to best of my ability and the patient's satisfaction. The patient was advised to contact the clinic with any questions that may arise after the clinic visit.      Disclaimer: This note consists of symbols derived from keyboarding, dictation " and/or voice recognition software. As a result, there may be errors in the script that have gone undetected. Please consider this when interpreting information found in this chart.       JUNG Salas D.P.M., F.A.C.F.A.S.        Again, thank you for allowing me to participate in the care of your patient.        Sincerely,        Jean-Pierre Salas DPM

## 2022-03-09 NOTE — NURSING NOTE
"Chief Complaint   Patient presents with     Consult     right foot corn       Initial /85   Pulse 81   Ht 1.575 m (5' 2\")   Wt 68.5 kg (151 lb)   LMP 09/28/2011   BMI 27.62 kg/m   Estimated body mass index is 27.62 kg/m  as calculated from the following:    Height as of this encounter: 1.575 m (5' 2\").    Weight as of this encounter: 68.5 kg (151 lb).  Medications and allergies reviewed.      Shae PALOMINO MA    "

## 2022-03-09 NOTE — PROGRESS NOTES
"PATIENT HISTORY:  Nessa Briceño is a 51 year old female who presents to clinic in consultation at the request of  Dinesh Blake M.D. with a chief complaint of corn on foot.  The patient is seen with her boyfriend.  The patient relates the pain is primarily located around the between the fifth and fourth digit.  Reports insidious onset without acute precipitating event. that has been going on for 1 year(s).  The patient has previously tried everything over the counter with little relief.  Denies any prior history of similar issues..  The patient is currently employed as RN.  Any previous notes and studies that pertain to the patient's condition were reviewed.    Pertinent medical, surgical and family history was reviewed in the [facility] chart.    Past Medical History:   Past Medical History:   Diagnosis Date     Abnormal glandular Papanicolaou smear of cervix        Medications:   Current Outpatient Medications:      albuterol (PROAIR HFA/PROVENTIL HFA/VENTOLIN HFA) 108 (90 Base) MCG/ACT inhaler, Inhale 2 puffs into the lungs every 6 hours as needed for shortness of breath / dyspnea or wheezing, Disp: 18 g, Rfl: 1     hydrochlorothiazide (HYDRODIURIL) 25 MG tablet, Take 1 tablet (25 mg) by mouth daily, Disp: 90 tablet, Rfl: 2     IBUPROFEN 600 MG OR TABS, , Disp: , Rfl:      omeprazole (PRILOSEC) 20 MG DR capsule, Take 1 capsule (20 mg) by mouth as needed, Disp: 90 capsule, Rfl: 3     ALBUTEROL SULFATE (2.5 MG/3ML) 0.083% IN NEBU, every 4 hours prn (Patient not taking: Reported on 11/17/2021), Disp: 1 Each, Rfl: 3     Allergies:    Allergies   Allergen Reactions     Oxycodone-Acetaminophen Other (See Comments)     Hallucinations       Vitals: /85   Pulse 81   Ht 1.575 m (5' 2\")   Wt 68.5 kg (151 lb)   LMP 09/28/2011   BMI 27.62 kg/m    BMI= Body mass index is 27.62 kg/m .    LOWER EXTREMITY PHYSICAL EXAM    Dermatologic: Noted hyperkeratotic lesions on the medial aspect of the distal fifth toe and lateral " aspect of the proximal interphalangeal joint of the fourth toe.  Otherwise skin is intact to right lower extremity without significant lesions, rash or abrasion.        Vascular: DP & PT pulses are intact & regular on the right.   CFT and skin temperature is normal to the right lower extremity.     Neurologic: Lower extremity sensation is intact to light touch.  No evidence of weakness in the right lower extremity.        Musculoskeletal: Patient is ambulatory without assistive device or brace.  No gross ankle deformity noted.  No foot or ankle joint effusion is noted.  Noted adductovarus positioning of the fifth toe on the right.            ASSESSMENT / PLAN:     ICD-10-CM    1. Callus of foot  L84    2. Pain of toe of right foot  M79.674 Orthopedic  Referral       I have explained to Nessa about the conditions.  We discussed the underlying contributing factors to the condition as well as treatment options along with expected length of recovery.  At this time, patient was advised to wear a silicone toe sleeve over the fourth toe to help offload the pressure causing the skin lesions to develop.  The patient was advised that if this does not help surgical resection of the fourth toe condyle may help offload the pressure as well.  The patient will return to the office if problems persist.    Nessa verbalized agreement with and understanding of the rational for the diagnosis and treatment plan.  All questions were answered to best of my ability and the patient's satisfaction. The patient was advised to contact the clinic with any questions that may arise after the clinic visit.      Disclaimer: This note consists of symbols derived from keyboarding, dictation and/or voice recognition software. As a result, there may be errors in the script that have gone undetected. Please consider this when interpreting information found in this chart.       JUNG Salas D.P.M., JAGRUTI.F.A.S.

## 2022-03-17 ENCOUNTER — HOSPITAL ENCOUNTER (OUTPATIENT)
Dept: NUCLEAR MEDICINE | Facility: CLINIC | Age: 52
Setting detail: NUCLEAR MEDICINE
Discharge: HOME | End: 2022-03-17
Attending: FAMILY MEDICINE
Payer: COMMERCIAL

## 2022-03-17 DIAGNOSIS — R79.89 LOW TSH LEVEL: ICD-10-CM

## 2022-03-17 PROCEDURE — 78014 THYROID IMAGING W/BLOOD FLOW: CPT

## 2022-03-17 PROCEDURE — 343N000001 HC RX 343: Performed by: FAMILY MEDICINE

## 2022-03-17 PROCEDURE — A9516 IODINE I-123 SOD IODIDE MIC: HCPCS | Performed by: FAMILY MEDICINE

## 2022-03-17 RX ADMIN — Medication 223 UCI.: at 08:23

## 2022-03-18 ENCOUNTER — HOSPITAL ENCOUNTER (OUTPATIENT)
Dept: NUCLEAR MEDICINE | Facility: CLINIC | Age: 52
Setting detail: NUCLEAR MEDICINE
Discharge: HOME | End: 2022-03-18
Attending: FAMILY MEDICINE
Payer: COMMERCIAL

## 2022-03-22 DIAGNOSIS — R00.2 PALPITATIONS: Primary | ICD-10-CM

## 2022-03-22 DIAGNOSIS — R79.89 LOW TSH LEVEL: Primary | ICD-10-CM

## 2022-03-22 RX ORDER — ATENOLOL 25 MG/1
25 TABLET ORAL DAILY
Qty: 90 TABLET | Refills: 1 | Status: SHIPPED | OUTPATIENT
Start: 2022-03-22 | End: 2022-05-18

## 2022-04-13 ENCOUNTER — HOSPITAL ENCOUNTER (OUTPATIENT)
Dept: CARDIOLOGY | Facility: CLINIC | Age: 52
Discharge: HOME | End: 2022-04-13
Attending: FAMILY MEDICINE | Admitting: FAMILY MEDICINE
Payer: COMMERCIAL

## 2022-04-13 DIAGNOSIS — R00.2 PALPITATIONS: ICD-10-CM

## 2022-04-13 LAB — LVEF ECHO: NORMAL

## 2022-04-13 PROCEDURE — 93306 TTE W/DOPPLER COMPLETE: CPT | Mod: 26 | Performed by: INTERNAL MEDICINE

## 2022-04-13 PROCEDURE — 93306 TTE W/DOPPLER COMPLETE: CPT

## 2022-04-19 ENCOUNTER — HOSPITAL ENCOUNTER (OUTPATIENT)
Dept: ULTRASOUND IMAGING | Facility: HOSPITAL | Age: 52
Discharge: HOME | End: 2022-04-19
Attending: INTERNAL MEDICINE | Admitting: INTERNAL MEDICINE
Payer: COMMERCIAL

## 2022-04-19 ENCOUNTER — OFFICE VISIT (OUTPATIENT)
Dept: ENDOCRINOLOGY | Facility: CLINIC | Age: 52
End: 2022-04-19
Payer: COMMERCIAL

## 2022-04-19 VITALS
WEIGHT: 154 LBS | SYSTOLIC BLOOD PRESSURE: 110 MMHG | DIASTOLIC BLOOD PRESSURE: 80 MMHG | HEART RATE: 80 BPM | BODY MASS INDEX: 28.17 KG/M2

## 2022-04-19 DIAGNOSIS — E06.9 THYROIDITIS: Primary | ICD-10-CM

## 2022-04-19 DIAGNOSIS — E06.9 THYROIDITIS: ICD-10-CM

## 2022-04-19 DIAGNOSIS — G47.00 INSOMNIA, UNSPECIFIED TYPE: ICD-10-CM

## 2022-04-19 DIAGNOSIS — R00.2 PALPITATIONS: ICD-10-CM

## 2022-04-19 DIAGNOSIS — R25.1 TREMOR: ICD-10-CM

## 2022-04-19 DIAGNOSIS — U07.1 INFECTION DUE TO 2019 NOVEL CORONAVIRUS: ICD-10-CM

## 2022-04-19 LAB
T3 SERPL-MCNC: 120 NG/DL (ref 60–181)
T3FREE SERPL-MCNC: 3.1 PG/ML (ref 1.6–3.9)
T4 FREE SERPL-MCNC: 0.83 NG/DL (ref 0.7–1.8)
THYROPEROXIDASE AB SERPL-ACNC: 94 IU/ML (ref 0–6)
TSH SERPL DL<=0.005 MIU/L-ACNC: 0.3 UIU/ML (ref 0.3–5)

## 2022-04-19 PROCEDURE — 36415 COLL VENOUS BLD VENIPUNCTURE: CPT | Performed by: INTERNAL MEDICINE

## 2022-04-19 PROCEDURE — 84439 ASSAY OF FREE THYROXINE: CPT | Performed by: INTERNAL MEDICINE

## 2022-04-19 PROCEDURE — 84481 FREE ASSAY (FT-3): CPT | Performed by: INTERNAL MEDICINE

## 2022-04-19 PROCEDURE — 76536 US EXAM OF HEAD AND NECK: CPT

## 2022-04-19 PROCEDURE — 86376 MICROSOMAL ANTIBODY EACH: CPT | Performed by: INTERNAL MEDICINE

## 2022-04-19 PROCEDURE — 99000 SPECIMEN HANDLING OFFICE-LAB: CPT | Performed by: INTERNAL MEDICINE

## 2022-04-19 PROCEDURE — 84443 ASSAY THYROID STIM HORMONE: CPT | Performed by: INTERNAL MEDICINE

## 2022-04-19 PROCEDURE — 83520 IMMUNOASSAY QUANT NOS NONAB: CPT | Mod: 90 | Performed by: INTERNAL MEDICINE

## 2022-04-19 PROCEDURE — 99204 OFFICE O/P NEW MOD 45 MIN: CPT | Performed by: INTERNAL MEDICINE

## 2022-04-19 RX ORDER — METHIMAZOLE 5 MG/1
TABLET ORAL
COMMUNITY
Start: 2022-04-08 | End: 2022-06-02

## 2022-04-19 RX ORDER — METOPROLOL TARTRATE 25 MG/1
TABLET, FILM COATED ORAL
COMMUNITY
Start: 2022-04-08 | End: 2022-04-20

## 2022-04-19 ASSESSMENT — ENCOUNTER SYMPTOMS
HYPOTENSION: 0
DIARRHEA: 1
JOINT SWELLING: 1
RECTAL PAIN: 0
EYE WATERING: 1
SPUTUM PRODUCTION: 0
DOUBLE VISION: 0
DEPRESSION: 0
EXERCISE INTOLERANCE: 1
DYSURIA: 0
ORTHOPNEA: 0
EYE PAIN: 0
COUGH DISTURBING SLEEP: 0
SHORTNESS OF BREATH: 1
NERVOUS/ANXIOUS: 0
HEARTBURN: 0
EYE REDNESS: 1
INSOMNIA: 0
EYE IRRITATION: 0
MUSCLE CRAMPS: 1
FLANK PAIN: 0
WHEEZING: 0
SNORES LOUDLY: 0
HYPERTENSION: 0
MUSCLE WEAKNESS: 1
VOMITING: 0
LEG PAIN: 0
ARTHRALGIAS: 1
DYSPNEA ON EXERTION: 1
NAUSEA: 0
BACK PAIN: 1
PANIC: 0
STIFFNESS: 1
DIFFICULTY URINATING: 1
PALPITATIONS: 1
COUGH: 0
BLOOD IN STOOL: 0
NECK PAIN: 1
CONSTIPATION: 1
SYNCOPE: 0
BLOATING: 1
DECREASED CONCENTRATION: 1
LIGHT-HEADEDNESS: 1
BOWEL INCONTINENCE: 0
HEMATURIA: 0
SLEEP DISTURBANCES DUE TO BREATHING: 0
ABDOMINAL PAIN: 0
HEMOPTYSIS: 0
POSTURAL DYSPNEA: 0
MYALGIAS: 1
JAUNDICE: 0

## 2022-04-19 NOTE — PROGRESS NOTES
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     11/2021: she had several week of upper respiratory tract symptoms   COVID-19 positive 1/24/2022.     She developed symptoms around mid 2/2022 including palpitations, insomnia, anxiety, tremors, hyper defecation. She did not have any neck pain, swelling, tenderness.     At this point (4/19/2022) symptoms are present but significantly improved.         The lab draw 3/2/2022 was the first time she was found to have thyroid dysfunction. She had never previously been on thyroid hormone therapy or ATD. No prior thyroid surgeries. No prior H/N radiation. No prior JEREZ therapy.     3/18/2022: NM U/S, JEREZ uptake at 24 hours: 0.8%. Homogeneous isotope distribution in a gland which is difficult to see due to the low radiotracer uptake. I reviewed the images in detail and agree with the radiology read.     Methimazole 5 mg daily was started 4/8/2022. She is on metoprolol 25 mg BID.     CT neck 6/25/2014: per radiology the thyroid gland is unremarkable    She has no known history of thyroid nodules.    She does not take iodine, or eat seaweed or kelp. No recent IV contrast exposure.     No FH of thyroid disease.    She has had ocular symptoms for the past 1 year, she has an upcoming ophthalmology appointment.     She takes a MVI but no biotin supplement.     Rare tobacco use. No alcohol use.     Objective:    BMI 28.17 kg/meters squared.  Blood pressure 110/80.  She appears well.  Regular rate and rhythm.  She does have conjunctival inflammation and using an exophthalmometer I measured the right eye at 20 mm and left at 19 mm with upper limit of normal in a  woman being 19 mm.  Clinical activity score is at most 1.  The thyroid is not inflamed.  There is some fullness in the lower anterior right neck but no discrete thyroid nodularity.  No cervical  lymphadenopathy.  Breathing comfortably on room air.  No significant lower extremity edema.    Assessment/Plan:    #Thyroiditis    Based on her uptake and scan this is thyroiditis.  We reviewed the natural history of thyroiditis.  Because this is thyroiditis, methimazole will not be helpful and she can stop it.  We will check labs today including thyroid autoantibodies to help predict if she will develop permanent hypothyroidism or eventually recovered to euthyroidism.  Because of the fullness in her right thyroid lobe I have also ordered a thyroid ultrasound.  We reviewed that we will stop her beta-blocker when she is euthyroid.    46 minutes spent on the date of the encounter doing chart review, history and exam, documentation and further activities as noted above.

## 2022-04-19 NOTE — LETTER
4/19/2022         RE: Nessa Briceño  [address] [facility] [address]        Dear Colleague,    Thank you for referring your patient, Nessa Briceño, to the [facility]. Please see a copy of my visit note below.    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     11/2021: she had several week of upper respiratory tract symptoms   COVID-19 positive 1/24/2022.     She developed symptoms around mid 2/2022 including palpitations, insomnia, anxiety, tremors, hyper defecation. She did not have any neck pain, swelling, tenderness.     At this point (4/19/2022) symptoms are present but significantly improved.         The lab draw 3/2/2022 was the first time she was found to have thyroid dysfunction. She had never previously been on thyroid hormone therapy or ATD. No prior thyroid surgeries. No prior H/N radiation. No prior JEREZ therapy.     3/18/2022: NM U/S, JEREZ uptake at 24 hours: 0.8%. Homogeneous isotope distribution in a gland which is difficult to see due to the low radiotracer uptake. I reviewed the images in detail and agree with the radiology read.     Methimazole 5 mg daily was started 4/8/2022. She is on metoprolol 25 mg BID.     CT neck 6/25/2014: per radiology the thyroid gland is unremarkable    She has no known history of thyroid nodules.    She does not take iodine, or eat seaweed or kelp. No recent IV contrast exposure.     No FH of thyroid disease.    She has had ocular symptoms for the past 1 year, she has an upcoming ophthalmology appointment.     She takes a MVI but no biotin supplement.     Rare tobacco use. No alcohol use.     Objective:    BMI 28.17 kg/meters squared.  Blood pressure 110/80.  She appears well.  Regular rate and rhythm.  She does have conjunctival inflammation and using an exophthalmometer I measured the right eye at 20 mm and left at 19 mm  with upper limit of normal in a  woman being 19 mm.  Clinical activity score is at most 1.  The thyroid is not inflamed.  There is some fullness in the lower anterior right neck but no discrete thyroid nodularity.  No cervical lymphadenopathy.  Breathing comfortably on room air.  No significant lower extremity edema.    Assessment/Plan:    #Thyroiditis    Based on her uptake and scan this is thyroiditis.  We reviewed the natural history of thyroiditis.  Because this is thyroiditis, methimazole will not be helpful and she can stop it.  We will check labs today including thyroid autoantibodies to help predict if she will develop permanent hypothyroidism or eventually recovered to euthyroidism.  Because of the fullness in her right thyroid lobe I have also ordered a thyroid ultrasound.  We reviewed that we will stop her beta-blocker when she is euthyroid.    46 minutes spent on the date of the encounter doing chart review, history and exam, documentation and further activities as noted above.       Again, thank you for allowing me to participate in the care of your patient.        Sincerely,        Jean-Claude Ragsdale MD

## 2022-04-20 DIAGNOSIS — Z86.39 H/O THYROIDITIS: Primary | ICD-10-CM

## 2022-04-20 LAB — TSH RECEP AB SER-ACNC: 1.33 IU/L (ref 0–1.75)

## 2022-05-09 ENCOUNTER — TELEPHONE (OUTPATIENT)
Dept: ENDOCRINOLOGY | Facility: CLINIC | Age: 52
End: 2022-05-09
Payer: COMMERCIAL

## 2022-05-09 NOTE — TELEPHONE ENCOUNTER
Patient was out of work Friday and today because of her thyroid.  She is requesting a return to work slip excusing her for the days missed and returning date of tomorrow.

## 2022-05-09 NOTE — TELEPHONE ENCOUNTER
I called the pt, she states that she was experiencing pain from constipation, then she had diarrhea, and she is exhausted. I informed I will speak to Dr Ragsdale, however she may need to reach out to her PCP. Pt understands.

## 2022-05-18 ENCOUNTER — VIRTUAL VISIT (OUTPATIENT)
Dept: FAMILY MEDICINE | Facility: CLINIC | Age: 52
End: 2022-05-18
Payer: COMMERCIAL

## 2022-05-18 DIAGNOSIS — K59.00 CONSTIPATION, UNSPECIFIED CONSTIPATION TYPE: Primary | ICD-10-CM

## 2022-05-18 PROCEDURE — 99213 OFFICE O/P EST LOW 20 MIN: CPT | Mod: GT | Performed by: FAMILY MEDICINE

## 2022-05-18 NOTE — LETTER
May 18, 2022      Nessa GEORGETTE Briceño  [address] [place] [id number]        To Whom It May Concern:    Nessa Briceño was seen in our clinic May 18, 2022 . She may return to work without restrictions as of May 18, 2022       Sincerely,        Mita Delgadillo MD

## 2022-05-18 NOTE — PROGRESS NOTES
"Nessa is a 51 year old who is being evaluated via a billable video visit.      How would you like to obtain your AVS? [email or web address]  If the video visit is dropped, the invitation should be resent by: Text to cell phone: [phone]  Will anyone else be joining your video visit? No    Video Start Time: 320pm    Assessment & Plan     Constipation, unspecified constipation type  Related to thyroid or to IBS   She is now regular     rec fiber daily     Note for return to work completed              BMI:   Estimated body mass index is 28.17 kg/m  as calculated from the following:    Height as of 3/9/22: 1.575 m (5' 2\").    Weight as of 4/19/22: 69.9 kg (154 lb).           Return in about 1 week (around 5/25/2022), or if symptoms worsen or fail to improve.    Mita Delgadillo MD  [facility]    Subjective   Nessa is a 51 year old who presents for the following health issues     History of Present Illness       Reason for visit:  Need a RTW note d/t being out for constipation and then diarrhea on 5/6-5/9  Symptom onset:  1-2 weeks ago  Symptoms include:  Constipation, then diarrhea. Exhaustion as well  Symptom intensity:  Moderate  Symptom progression:  Improving  Had these symptoms before:  Yes  Has tried/received treatment for these symptoms:  No  What makes it worse:  Not taking a laxative tea.  Nit resting  What makes it better:  The tea and lots of sleep    She eats 2-3 servings of fruits and vegetables daily.She consumes 1 sweetened beverage(s) daily.She exercises with enough effort to increase her heart rate 20 to 29 minutes per day.  She exercises with enough effort to increase her heart rate 4 days per week.   She is taking medications regularly.     Needs letter for work regarding absence from Illness on 5/6-5/9  - has been on vacation since  Pt will get letter from [employer] and send to [employer]    Has history of severe constipation followed by diarrhea  Now normal BMs     Has had some " thyroid issues   Hyper now eu and may be now hypo     This flares for her once a year or so     She is working with endo                   Review of Systems   Constitutional, HEENT, cardiovascular, pulmonary, gi and gu systems are negative, except as otherwise noted.      Objective           Vitals:  No vitals were obtained today due to virtual visit.    Physical Exam   GENERAL: Healthy, alert and no distress  EYES: Eyes grossly normal to inspection.  No discharge or erythema, or obvious scleral/conjunctival abnormalities.  RESP: No audible wheeze, cough, or visible cyanosis.  No visible retractions or increased work of breathing.    SKIN: Visible skin clear. No significant rash, abnormal pigmentation or lesions.  NEURO: Cranial nerves grossly intact.  Mentation and speech appropriate for age.  PSYCH: Mentation appears normal, affect normal/bright, judgement and insight intact, normal speech and appearance well-groomed.                Video-Visit Details    Type of service:  Video Visit    Video End Time:340    Originating Location (pt. Location): Home    Distant Location (provider location):  [facility]     Platform used for Video Visit: [employer]

## 2022-05-24 ENCOUNTER — LAB (OUTPATIENT)
Dept: LAB | Facility: CLINIC | Age: 52
End: 2022-05-24
Payer: COMMERCIAL

## 2022-05-24 DIAGNOSIS — Z86.39 H/O THYROIDITIS: ICD-10-CM

## 2022-05-24 LAB
T3 SERPL-MCNC: 147 NG/DL (ref 60–181)
T4 FREE SERPL-MCNC: 0.81 NG/DL (ref 0.76–1.46)
TSH SERPL DL<=0.005 MIU/L-ACNC: 2.46 MU/L (ref 0.4–4)

## 2022-05-24 PROCEDURE — 84439 ASSAY OF FREE THYROXINE: CPT

## 2022-05-24 PROCEDURE — 36415 COLL VENOUS BLD VENIPUNCTURE: CPT

## 2022-05-24 PROCEDURE — 84480 ASSAY TRIIODOTHYRONINE (T3): CPT

## 2022-05-24 PROCEDURE — 84443 ASSAY THYROID STIM HORMONE: CPT

## 2022-05-31 DIAGNOSIS — E06.9 THYROIDITIS: Primary | ICD-10-CM

## 2022-06-02 ENCOUNTER — OFFICE VISIT (OUTPATIENT)
Dept: FAMILY MEDICINE | Facility: CLINIC | Age: 52
End: 2022-06-02
Payer: COMMERCIAL

## 2022-06-02 VITALS
HEART RATE: 70 BPM | HEIGHT: 62 IN | SYSTOLIC BLOOD PRESSURE: 124 MMHG | RESPIRATION RATE: 18 BRPM | DIASTOLIC BLOOD PRESSURE: 84 MMHG | TEMPERATURE: 97.8 F | WEIGHT: 154 LBS | BODY MASS INDEX: 28.34 KG/M2

## 2022-06-02 DIAGNOSIS — Z86.39 HISTORY OF THYROIDITIS: Primary | ICD-10-CM

## 2022-06-02 DIAGNOSIS — Z87.39 HISTORY OF INFLAMMATORY ARTHRITIS: ICD-10-CM

## 2022-06-02 PROCEDURE — 99213 OFFICE O/P EST LOW 20 MIN: CPT | Performed by: FAMILY MEDICINE

## 2022-06-02 ASSESSMENT — ASTHMA QUESTIONNAIRES: ACT_TOTALSCORE: 23

## 2022-06-02 ASSESSMENT — PAIN SCALES - GENERAL: PAINLEVEL: NO PAIN (0)

## 2022-06-02 NOTE — PROGRESS NOTES
"  Assessment & Plan     History of thyroiditis  History of hyperthyroidism, now euthyroid, thyroid peroxidase antibody positive.  Following endocrinology.  Symptoms stable currently.  [facility] paperwork signed    History of inflammatory arthritis  History of inflammatory arthritis, has an appointment with rheumatology next month        Dinesh Blake MD  [facility]    David Szymanski is a 52 year old who presents for the following health issues     History of Present Illness       Hypothyroidism:     Since last visit, patient describes the following symptoms::  Anxiety, Constipation, Depression, Dry skin, Fatigue, Loose stools, Tremors and Weight gain (Needs [facility] paperwork. Missing work due to symptoms of fatigue, constipation, loose stools. Heart palpitations at times. Lots of anxiety due to this. )    Weight gain::  Less than 5 lbs.    She eats 2-3 servings of fruits and vegetables daily.She consumes 1 sweetened beverage(s) daily.She exercises with enough effort to increase her heart rate 10 to 19 minutes per day.  She exercises with enough effort to increase her heart rate 3 or less days per week.   She is taking medications regularly.       Review of Systems   Constitutional, HEENT, cardiovascular, pulmonary, gi and gu systems are negative, except as otherwise noted.      Objective    /84 (Cuff Size: Adult Regular)   Pulse 70   Temp 97.8  F (36.6  C) (Tympanic)   Resp 18   Ht 1.575 m (5' 2\")   Wt 69.9 kg (154 lb)   LMP 09/28/2011   Breastfeeding No   BMI 28.17 kg/m    Body mass index is 28.17 kg/m .  Physical Exam   GENERAL: healthy, alert and no distress  EYES: Eyes grossly normal to inspection, PERRL and conjunctivae and sclerae normal  NECK: no adenopathy, no asymmetry, masses, or scars and thyroid normal to palpation  RESP: lungs clear to auscultation - no rales, rhonchi or wheezes  CV: regular rate and rhythm, normal S1 S2, no S3 or S4, no murmur, click or rub, no " peripheral edema   ABDOMEN: soft, nontender, no hepatosplenomegaly, no masses  MS: no gross musculoskeletal defects noted, no edema

## 2022-06-02 NOTE — NURSING NOTE
"Chief Complaint   Patient presents with     Thyroid Problem     /84 (Cuff Size: Adult Regular)   Pulse 70   Temp 97.8  F (36.6  C) (Tympanic)   Resp 18   Ht 1.575 m (5' 2\")   Wt 69.9 kg (154 lb)   LMP 09/28/2011   Breastfeeding No   BMI 28.17 kg/m   Estimated body mass index is 28.17 kg/m  as calculated from the following:    Height as of this encounter: 1.575 m (5' 2\").    Weight as of this encounter: 69.9 kg (154 lb).  Patient presents to the clinic using No DME      Health Maintenance that is potentially due pending provider review:    Health Maintenance Due   Topic Date Due     ADVANCE CARE PLANNING  Never done     COVID-19 Vaccine (1) Never done     COLORECTAL CANCER SCREENING  Never done     ZOSTER IMMUNIZATION (1 of 2) Never done     PREVENTIVE CARE VISIT  12/04/2019     ASTHMA ACTION PLAN  06/16/2022                "

## 2022-07-05 ENCOUNTER — MYC MEDICAL ADVICE (OUTPATIENT)
Dept: FAMILY MEDICINE | Facility: CLINIC | Age: 52
End: 2022-07-05

## 2022-07-05 DIAGNOSIS — Z00.00 ENCOUNTER FOR WELL ADULT EXAM WITHOUT ABNORMAL FINDINGS: ICD-10-CM

## 2022-07-05 NOTE — TELEPHONE ENCOUNTER
BP Readings from Last 6 Encounters:   06/02/22 124/84   04/19/22 110/80   03/09/22 130/85   03/02/22 122/82   09/01/20 118/80   10/15/19 120/82     Creatinine   Date Value Ref Range Status   03/02/2022 0.64 0.52 - 1.04 mg/dL Final   12/04/2018 0.76 0.52 - 1.04 mg/dL Final

## 2022-07-06 ENCOUNTER — LAB (OUTPATIENT)
Dept: LAB | Facility: CLINIC | Age: 52
End: 2022-07-06
Payer: COMMERCIAL

## 2022-07-06 ENCOUNTER — HOSPITAL ENCOUNTER (OUTPATIENT)
Dept: GENERAL RADIOLOGY | Facility: HOSPITAL | Age: 52
Discharge: HOME | End: 2022-07-06
Attending: INTERNAL MEDICINE
Payer: COMMERCIAL

## 2022-07-06 ENCOUNTER — OFFICE VISIT (OUTPATIENT)
Dept: RHEUMATOLOGY | Facility: CLINIC | Age: 52
End: 2022-07-06
Attending: FAMILY MEDICINE

## 2022-07-06 VITALS
WEIGHT: 152.5 LBS | BODY MASS INDEX: 28.06 KG/M2 | HEART RATE: 88 BPM | HEIGHT: 62 IN | SYSTOLIC BLOOD PRESSURE: 126 MMHG | DIASTOLIC BLOOD PRESSURE: 84 MMHG

## 2022-07-06 DIAGNOSIS — M06.4 INFLAMMATORY POLYARTHRITIS (H): ICD-10-CM

## 2022-07-06 DIAGNOSIS — M25.50 MULTIPLE JOINT PAIN: ICD-10-CM

## 2022-07-06 DIAGNOSIS — M06.4 INFLAMMATORY POLYARTHRITIS (H): Primary | ICD-10-CM

## 2022-07-06 LAB
ALBUMIN SERPL BCG-MCNC: 4.3 G/DL (ref 3.5–5.2)
ALT SERPL W P-5'-P-CCNC: 28 U/L (ref 10–35)
BASOPHILS # BLD AUTO: 0 10E3/UL (ref 0–0.2)
BASOPHILS NFR BLD AUTO: 1 %
CREAT SERPL-MCNC: 0.83 MG/DL (ref 0.51–0.95)
CRP SERPL-MCNC: 14.7 MG/L
EOSINOPHIL # BLD AUTO: 0.2 10E3/UL (ref 0–0.7)
EOSINOPHIL NFR BLD AUTO: 3 %
ERYTHROCYTE [DISTWIDTH] IN BLOOD BY AUTOMATED COUNT: 12.1 % (ref 10–15)
ERYTHROCYTE [SEDIMENTATION RATE] IN BLOOD BY WESTERGREN METHOD: 22 MM/HR (ref 0–20)
GFR SERPL CREATININE-BSD FRML MDRD: 84 ML/MIN/1.73M2
HCT VFR BLD AUTO: 41 % (ref 35–47)
HGB BLD-MCNC: 13.9 G/DL (ref 11.7–15.7)
IMM GRANULOCYTES # BLD: 0 10E3/UL
IMM GRANULOCYTES NFR BLD: 0 %
LYMPHOCYTES # BLD AUTO: 1.9 10E3/UL (ref 0.8–5.3)
LYMPHOCYTES NFR BLD AUTO: 27 %
MCH RBC QN AUTO: 30.5 PG (ref 26.5–33)
MCHC RBC AUTO-ENTMCNC: 33.9 G/DL (ref 31.5–36.5)
MCV RBC AUTO: 90 FL (ref 78–100)
MONOCYTES # BLD AUTO: 0.6 10E3/UL (ref 0–1.3)
MONOCYTES NFR BLD AUTO: 9 %
NEUTROPHILS # BLD AUTO: 4.2 10E3/UL (ref 1.6–8.3)
NEUTROPHILS NFR BLD AUTO: 61 %
PLATELET # BLD AUTO: 307 10E3/UL (ref 150–450)
RBC # BLD AUTO: 4.55 10E6/UL (ref 3.8–5.2)
URATE SERPL-MCNC: 5.4 MG/DL (ref 2.4–5.7)
WBC # BLD AUTO: 6.9 10E3/UL (ref 4–11)

## 2022-07-06 PROCEDURE — 36415 COLL VENOUS BLD VENIPUNCTURE: CPT

## 2022-07-06 PROCEDURE — 84460 ALANINE AMINO (ALT) (SGPT): CPT

## 2022-07-06 PROCEDURE — 86140 C-REACTIVE PROTEIN: CPT

## 2022-07-06 PROCEDURE — 86038 ANTINUCLEAR ANTIBODIES: CPT

## 2022-07-06 PROCEDURE — 99204 OFFICE O/P NEW MOD 45 MIN: CPT | Performed by: INTERNAL MEDICINE

## 2022-07-06 PROCEDURE — 82040 ASSAY OF SERUM ALBUMIN: CPT

## 2022-07-06 PROCEDURE — 86200 CCP ANTIBODY: CPT

## 2022-07-06 PROCEDURE — 85652 RBC SED RATE AUTOMATED: CPT

## 2022-07-06 PROCEDURE — 82565 ASSAY OF CREATININE: CPT

## 2022-07-06 PROCEDURE — 73610 X-RAY EXAM OF ANKLE: CPT | Mod: 50,FY

## 2022-07-06 PROCEDURE — 85025 COMPLETE CBC W/AUTO DIFF WBC: CPT

## 2022-07-06 PROCEDURE — 86431 RHEUMATOID FACTOR QUANT: CPT

## 2022-07-06 PROCEDURE — 84550 ASSAY OF BLOOD/URIC ACID: CPT

## 2022-07-06 PROCEDURE — 73560 X-RAY EXAM OF KNEE 1 OR 2: CPT | Mod: 50,FY

## 2022-07-06 RX ORDER — HYDROCHLOROTHIAZIDE 25 MG/1
25 TABLET ORAL DAILY
Qty: 90 TABLET | Refills: 0 | Status: SHIPPED | OUTPATIENT
Start: 2022-07-06 | End: 2023-09-13

## 2022-07-06 RX ORDER — PREDNISONE 2.5 MG/1
TABLET ORAL
Qty: 90 TABLET | Refills: 2 | Status: SHIPPED | OUTPATIENT
Start: 2022-07-06 | End: 2022-08-03

## 2022-07-06 NOTE — PROGRESS NOTES
This document was created using a software with less than 100% fidelity, at times resulting in unintended, even erroneous syntax and grammar.  The reader is advised to keep this under consideration while reviewing, interpreting this note.      Rheumatology Consult Note      Nessa Briceño     YOB: 1970 Age: 52 year old    Date of visit: 7/06/22    PCP: Mita Delgadillo    Chief Complaint   Patient presents with:  Consult: Multiple joint pain       Assessment and Plan     Nessa was seen today for consult.    Diagnoses and all orders for this visit:    Inflammatory polyarthritis (H)  -     XR Knee AP/Lat Standing Bilateral; Future  -     XR Ankle Bilateral G/E 3 Views; Future  -     XR Hand Bilateral G/E 3 Views; Future  -     Erythrocyte sedimentation rate auto; Future  -     Uric acid; Future  -     Rheumatoid factor; Future  -     CBC with Platelets & Differential; Future  -     Anti Nuclear Thelma IgG by IFA with Reflex; Future  -     Albumin level; Future  -     ALT; Future  -     Creatinine; Future  -     CRP inflammation; Future  -     Cyclic Citrullinated Peptide Antibody IgG; Future  -     predniSONE (DELTASONE) 2.5 MG tablet; 7.5 mg daily for 4 weeks, reduce by 2.5 mg every 4 weeks to 5 milligrams daily, and to 2.5 mg daily and stop    Multiple joint pain  -     Adult Rheumatology  Referral  -     XR Knee AP/Lat Standing Bilateral; Future  -     XR Ankle Bilateral G/E 3 Views; Future  -     XR Hand Bilateral G/E 3 Views; Future  -     Erythrocyte sedimentation rate auto; Future  -     Uric acid; Future  -     Rheumatoid factor; Future  -     CBC with Platelets & Differential; Future  -     Anti Nuclear Thelma IgG by IFA with Reflex; Future  -     Albumin level; Future  -     ALT; Future  -     Creatinine; Future  -     CRP inflammation; Future  -     Cyclic Citrullinated Peptide Antibody IgG; Future  -     predniSONE (DELTASONE) 2.5 MG tablet; 7.5 mg daily for 4 weeks, reduce by 2.5  "mg every 4 weeks to 5 milligrams daily, and to 2.5 mg daily and stop           This patient with 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  I will check labs and x-rays as noted.  We discussed the fact that part of her symptoms especially in the lower extremity may be contributed to by the history of trauma such as the right knee and the left ankle.  Today we discussed the pros and cons of taking prednisone and starting her on 70 half milligrams daily.  We will meet her in 3 months.  Consider starting sulfasalazine or hydroxychloroquine at the time especially if she is able to see her ophthalmologist and get clearance and then the latter would be the tries to consider.          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  She recalls that it was back in 2010 or 2011 when she started experiencing pain in multiple joints especially the upper extremities.  She was seen in rheumatology in various clinics started on hydroxychloroquine as she thought it was a \"miracle drug\" as this allowed her to return to normal function.  At times when she would have an hour or so long drive it will be hard for her to get out of the car at the end of the drive.  With the hydroxychloroquine she felt so much better.  She stayed on that for 3 years.  She then developed visual symptoms.  Between her and her rheumatologist it was decided that she should discontinue hydroxychloroquine her visual symptoms have remained unchanged ever since.  She has been to ophthalmology on more than one occasion since.  Over the years she has taken ibuprofen which has taken the edge off.  Now the pain is reached a point where it is hard for her to " continue she noted the pain is in her hands, wrists, elbows and knees and ankles the latter more so than the earlier.  Worse in the morning, takes about 30 minutes for her to limber up.  If after days activity she is sits down, the pain and the stiffness recurs similar to the one in the mornings.  She sometimes observed swelling to the point with the veins on the back of her hands disappear.  She is not aware of personal family history of psoriasis ulcerative colitis or Crohn's disease.  She noted no family history of rheumatoid arthritis lupus or ankylosing spondylitis.  She has cut back smoking significantly now down to 1 cigarette today.  Alcohol rarely.  She is status post hysterectomy few years back.  She reports history of right knee area fracture when she was 7 years of age, left ankle when she fell going down the steps carrying laundry at home.           Active Problem List     Patient Active Problem List   Diagnosis     Hypoglycemia     Dysmenorrhea     Neck pain     Ganglion, joint     Hip pain     Dysfunctional uterine bleeding     GERD (gastroesophageal reflux disease)     CARDIOVASCULAR SCREENING; LDL GOAL LESS THAN 160     Menorrhagia     Pain in joint     Mild intermittent asthma without complication     Low TSH level     Past Medical History     Past Medical History:   Diagnosis Date     Abnormal glandular Papanicolaou smear of cervix      Past Surgical History     Past Surgical History:   Procedure Laterality Date     HC REDUCTION OF LARGE BREAST  1997     HYSTERECTOMY VAGINAL Bilateral 12/07/2017     KNEE SURGERY       SURGICAL HISTORY OF -   1978 1991    Rt knee X 2     Allergy     Allergies   Allergen Reactions     Oxycodone-Acetaminophen Other (See Comments)     Hallucinations     Current Medication List     Current Outpatient Medications   Medication Sig     albuterol (PROAIR HFA/PROVENTIL HFA/VENTOLIN HFA) 108 (90 Base) MCG/ACT inhaler Inhale 2 puffs into the lungs every 6 hours as needed for  "shortness of breath / dyspnea or wheezing     ALBUTEROL SULFATE (2.5 MG/3ML) 0.083% IN NEBU every 4 hours prn     hydrochlorothiazide (HYDRODIURIL) 25 MG tablet Take 1 tablet (25 mg) by mouth daily     IBUPROFEN 600 MG OR TABS      No current facility-administered medications for this visit.            Family History     Family History   Problem Relation Age of Onset     Neurologic Disorder Mother         tic     Cancer Mother         melanoma     C.A.D. Father      Diabetes Father      Hypertension Father      Arthritis Father      Cancer Father         pan     Neurologic Disorder Father      Coronary Artery Disease Father      Diabetes Maternal Grandmother      Cerebrovascular Disease Maternal Grandmother      Circulatory Maternal Grandfather      Gastrointestinal Disease Paternal Grandmother      Cancer - colorectal Paternal Grandmother      Heart Disease Paternal Grandmother      Alzheimer Disease Paternal Grandfather         pick's     Hypertension Brother          Physical Exam     COMPREHENSIVE EXAMINATION:  Vitals:    07/06/22 1303   BP: 126/84   BP Location: Right arm   Patient Position: Sitting   Cuff Size: Adult Regular   Pulse: 88   Weight: 69.2 kg (152 lb 8 oz)   Height: 1.575 m (5' 2\")     A well appearing alert oriented female. Vital data as noted above. Her eyes examined for inflammation/scleromalacia. ENT examined for oral mucositis, moisture, thrush, nasal deformity, external ear redness, deformity. Her neck is examined for suppleness and lymphadenopathy.  Cardiopulmonary examination without dyspnea at rest, use of accessory muscles of breathing, wheezing, edema, peripheral or central cyanosis.  Abdomen examined for softness, tenderness, obvious organomegaly.  Skin examined for heliotrope, malar area eruption, lupus pernio, periungual erythema, sclerodactyly, papules, erythema nodosum, purpura, nail pitting, onycholysis, and obvious psoriasis lesion. Neurological examination done for alertness, " speech, facial symmetry,  tone and power in upper and lower extremities, and gait. The joint examination is performed for swelling, tenderness, warmth, erythema, and range of motion in the following joints: DIPs, PIPs, MCPs, wrists, first CMC's, elbows, shoulders, hips, knees, ankles, feet; spine for range of motion and paraspinal muscles for tenderness. The salient normal / abnormal findings are appended.  She is tender in the elbows bilaterally, ankles bilaterally she is scar on the right knee from the prior surgery when she was 7-year-old for the fracture there.    Labs / Imaging (select studies)     Rheumatoid Factor   Date Value Ref Range Status   10/25/2010 7 0 - 14 IU/mL Final      Hemoglobin   Date Value Ref Range Status   03/02/2022 13.9 11.7 - 15.7 g/dL Final   10/25/2010 12.5 11.7 - 15.7 g/dL Final   06/29/2009 12.5 11.7 - 15.7 g/dL Final   02/21/2009 9.3 (L) 11.7 - 15.7 g/dL Final     Urea Nitrogen   Date Value Ref Range Status   03/02/2022 19 7 - 30 mg/dL Final   12/04/2018 15 7 - 30 mg/dL Final   04/24/2008 11 5 - 24 mg/dL Final     Sed Rate   Date Value Ref Range Status   02/06/2012 35 (H) 0 - 20 mm/h Final   01/02/2012 26 (H) 0 - 20 mm/h Final   10/25/2010 41 (H) 0 - 20 mm/h Final     CRP Inflammation   Date Value Ref Range Status   02/06/2012 5.1 0.0 - 8.0 mg/L Final   01/02/2012 9.3 (H) 0.0 - 8.0 mg/L Final   10/25/2010 5.1 0.0 - 8.0 mg/L Final     AST   Date Value Ref Range Status   02/07/2012 19 0 - 45 U/L Final   10/25/2010 19 0 - 45 U/L Final     Neutrophil Cytoplasmic IgG Antibody   Date Value Ref Range Status   12/15/2010   Final    <1:20  Reference range: <1:20  (Note)  <1:20  TEST INFORMATION: Anti-Neutrophil Cyto Ab, IgG    Neutrophil Cytoplasmic Antibodies (C-ANCA = granular  cytoplasmic staining, P-ANCA = perinuclear staining) are  found in the serum of over 90 percent of patients with  certain necrotizing systemic vasculitides, and usually in  less than 5 percent of patients with  collagen vascular  disease or arthritis.  Performed by [facility],  [address], [facility] [place] [address] [phone]  [email or web address], Tasia Thrasher MD, Lab. Director     Albumin   Date Value Ref Range Status   02/07/2012 4.0 3.9 - 5.1 g/dL Final   10/25/2010 4.2 3.9 - 5.1 g/dL Final     Alkaline Phosphatase   Date Value Ref Range Status   02/07/2012 60 40 - 150 U/L Final   10/25/2010 59 40 - 150 U/L Final     ALT   Date Value Ref Range Status   02/07/2012 13 0 - 50 U/L Final   10/25/2010 13 0 - 50 U/L Final   04/24/2008 4 0 - 50 U/L Final     Rheumatoid Factor   Date Value Ref Range Status   10/25/2010 7 0 - 14 IU/mL Final          Immunization History     Immunization History   Administered Date(s) Administered     HepB 09/02/2004, 07/07/2005, 03/12/2008     HepB, Unspecified 05/13/2008, 04/23/2013, 06/04/2013     Historical DTP/aP 1970, 1970, 02/05/1970     MMR 12/22/1970, 09/02/2004, 07/07/2005     Mantoux Tuberculin Skin Test 07/11/2005, 08/07/2006, 03/12/2008, 04/08/2013     OPV, trivalent, live 1969, 02/05/1970     Rabavert 10/03/2015, 10/06/2015, 10/10/2015, 10/17/2015     TD (ADULT, 7+) 09/02/2004     TDAP Vaccine (Boostrix) 06/18/2015     Td (Adult), Adsorbed 12/31/1994

## 2022-07-07 LAB
ANA SER QL IF: NEGATIVE
CCP AB SER IA-ACNC: 1 U/ML
RHEUMATOID FACT SER NEPH-ACNC: <6 IU/ML

## 2022-08-03 ENCOUNTER — TELEPHONE (OUTPATIENT)
Dept: RHEUMATOLOGY | Facility: CLINIC | Age: 52
End: 2022-08-03

## 2022-08-03 ENCOUNTER — VIRTUAL VISIT (OUTPATIENT)
Dept: RHEUMATOLOGY | Facility: CLINIC | Age: 52
End: 2022-08-03
Payer: COMMERCIAL

## 2022-08-03 DIAGNOSIS — M06.4 INFLAMMATORY POLYARTHRITIS (H): ICD-10-CM

## 2022-08-03 DIAGNOSIS — M25.50 MULTIPLE JOINT PAIN: Primary | ICD-10-CM

## 2022-08-03 PROCEDURE — 99214 OFFICE O/P EST MOD 30 MIN: CPT | Mod: GT | Performed by: INTERNAL MEDICINE

## 2022-08-03 RX ORDER — HYDROXYCHLOROQUINE SULFATE 200 MG/1
200 TABLET, FILM COATED ORAL DAILY
Qty: 60 TABLET | Refills: 3 | Status: SHIPPED | OUTPATIENT
Start: 2022-08-03 | End: 2022-09-02

## 2022-08-03 RX ORDER — PREDNISONE 10 MG/1
10 TABLET ORAL DAILY
Qty: 30 TABLET | Refills: 0 | Status: SHIPPED | OUTPATIENT
Start: 2022-08-03 | End: 2022-09-02

## 2022-08-03 NOTE — PROGRESS NOTES
Nessa is a 52 year old who is being evaluated via a billable video visit.      How would you like to obtain your AVS? MyChart  If the video visit is dropped, the invitation should be resent by: Text to cell phone: [phone]  Will anyone else be joining your video visit? No        Video-Visit Details    Video Start Time: 10:50 am    Type of service:  Video Visit    Video End Time:11:03 AM    Originating Location (pt. Location): Other In [place] in car    Distant Location (provider location):  [facility]     Platform used for Video Visit: BPT      This document was created using a software with less than 100% fidelity, at times resulting in unintended, even erroneous syntax and grammar.  The reader is advised to keep this under consideration while reviewing, interpreting this note.           ASSESSMENT AND PLAN:    Diagnoses and all orders for this visit:  Multiple joint pain  -     hydroxychloroquine (PLAQUENIL) 200 MG tablet; Take 1 tablet (200 mg) by mouth daily for 30 days  -     predniSONE (DELTASONE) 10 MG tablet; Take 1 tablet (10 mg) by mouth daily for 30 days  Inflammatory polyarthritis (H)  -     hydroxychloroquine (PLAQUENIL) 200 MG tablet; Take 1 tablet (200 mg) by mouth daily for 30 days  -     predniSONE (DELTASONE) 10 MG tablet; Take 1 tablet (10 mg) by mouth daily for 30 days    She is going to add hydroxychloroquine at once daily, once she has been seen and ophthalmology and if they are agreeable I will go to the twice daily doors please see below for the background here. I'll also ask her to increase prednisone to 10 mg daily.  Follow up in 4 week      HISTORY OF PRESENTING ILLNESS:  Nessa Briceño 52 year old is evaluated here via video/audio link. Follow up for polyarthralgias, some signal suggestive of inflammatory joint disease. Her recent labs and xrays revealed. She has mild She has mild increase in ESR and CRP. With the current dose of prednisone she has noted a  "modest improvement. it was back in 2010 or 2011 when she started experiencing pain in multiple joints especially the upper extremities.  She was seen in rheumatology in various clinics started on hydroxychloroquine as she thought it was a \"miracle drug\" as this allowed her to return to normal function.  At times when she would have an hour or so long drive it will be hard for her to get out of the car at the end of the drive.  With the hydroxychloroquine she felt so much better.  She stayed on that for 3 years.  She then developed visual symptoms.  Between her and her rheumatologist it was decided that she should discontinue hydroxychloroquine her visual symptoms have remained unchanged ever since.  She has been to ophthalmology on more than one occasion since.  Over the years she has taken ibuprofen which has taken the edge off.  Now the pain is reached a point where it is hard for her to continue she noted the pain is in her hands, wrists, elbows and knees and ankles the latter more so than the earlier.  Worse in the morning, takes about 30 minutes for her to limber up.  If after days activity she is sits down, the pain and the stiffness recurs similar to the one in the mornings.  She sometimes observed swelling to the point with the veins on the back of her hands disappear.  She is not aware of personal family history of psoriasis ulcerative colitis or Crohn's disease.  She noted no family history of rheumatoid arthritis lupus or ankylosing spondylitis.  She has cut back smoking significantly now down to 1 cigarette today.  Alcohol rarely.  She is status post hysterectomy few years back.  She reports history of right knee area fracture when she was 7 years of age, left ankle when she fell going down the steps carrying laundry at home.       ROS enquiry held for fever, ocular symptoms, rash, headache,  GI issues.  Today we also discussed the issues related to the current pandemic, the pros and cons of the current " treatment plan, the CDC guidelines such as social distancing washing the hands covering the cough.  ALLERGIES:Oxycodone-acetaminophen    PAST MEDICAL/ACTIVE PROBLEMS/MEDICATION/SOCIAL DATA  Past Medical History:   Diagnosis Date     Abnormal glandular Papanicolaou smear of cervix      History   Smoking Status     Former Smoker     Types: Cigarettes     Quit date: 2001   Smokeless Tobacco     Never Used     Comment: smoked once every few months, a few cigs.     Patient Active Problem List   Diagnosis     Hypoglycemia     Dysmenorrhea     Neck pain     Ganglion, joint     Hip pain     Dysfunctional uterine bleeding     GERD (gastroesophageal reflux disease)     CARDIOVASCULAR SCREENING; LDL GOAL LESS THAN 160     Menorrhagia     Pain in joint     Mild intermittent asthma without complication     Low TSH level     Current Outpatient Medications   Medication Sig Dispense Refill     albuterol (PROAIR HFA/PROVENTIL HFA/VENTOLIN HFA) 108 (90 Base) MCG/ACT inhaler Inhale 2 puffs into the lungs every 6 hours as needed for shortness of breath / dyspnea or wheezing 18 g 1     ALBUTEROL SULFATE (2.5 MG/3ML) 0.083% IN NEBU every 4 hours prn 1 Each 3     hydrochlorothiazide (HYDRODIURIL) 25 MG tablet Take 1 tablet (25 mg) by mouth daily 90 tablet 0     IBUPROFEN 600 MG OR TABS        predniSONE (DELTASONE) 2.5 MG tablet 7.5 mg daily for 4 weeks, reduce by 2.5 mg every 4 weeks to 5 milligrams daily, and to 2.5 mg daily and stop 90 tablet 2         EXAMINATION:    Using the audio and video link as best as possible the constitutional, neck, neurologic, psych, skin, both upper extremities areas/organ system were evaluated during this assessment.  Some of the important findings: Alert, oriented, speech fluent.    Able to fully flex the digits, into fists bilaterally, wrist and elbow range of motion appear normal, abduction of the shoulder is normal.      LAB / IMAGING DATA:  ALT   Date Value Ref Range Status   07/06/2022 28 10 - 35  U/L Final   02/07/2012 13 0 - 50 U/L Final   10/25/2010 13 0 - 50 U/L Final   04/24/2008 4 0 - 50 U/L Final     Albumin   Date Value Ref Range Status   07/06/2022 4.3 3.5 - 5.2 g/dL Final   02/07/2012 4.0 3.9 - 5.1 g/dL Final   10/25/2010 4.2 3.9 - 5.1 g/dL Final       WBC   Date Value Ref Range Status   10/25/2010 8.7 4.0 - 11.0 10e9/L Final   06/29/2009 8.0 4.0 - 11.0 10e9/L Final     WBC Count   Date Value Ref Range Status   07/06/2022 6.9 4.0 - 11.0 10e3/uL Final   03/02/2022 5.7 4.0 - 11.0 10e3/uL Final     Hemoglobin   Date Value Ref Range Status   07/06/2022 13.9 11.7 - 15.7 g/dL Final   03/02/2022 13.9 11.7 - 15.7 g/dL Final   10/25/2010 12.5 11.7 - 15.7 g/dL Final   06/29/2009 12.5 11.7 - 15.7 g/dL Final   02/21/2009 9.3 (L) 11.7 - 15.7 g/dL Final     Platelet Count   Date Value Ref Range Status   07/06/2022 307 150 - 450 10e3/uL Final   03/02/2022 306 150 - 450 10e3/uL Final   10/25/2010 339 150 - 450 10e9/L Final   06/29/2009 323 150 - 450 10e9/L Final   02/20/2009 206 150 - 450 10e9/L Final       No results found for: JEAN PIERRE

## 2022-08-22 NOTE — TELEPHONE ENCOUNTER
08/22- lmtcb x4 and not able to get a hold of her. Sent my chart message for pt to call back to schedule f/u.

## 2022-09-14 ENCOUNTER — LAB (OUTPATIENT)
Dept: LAB | Facility: CLINIC | Age: 52
End: 2022-09-14
Payer: COMMERCIAL

## 2022-09-14 DIAGNOSIS — E06.9 THYROIDITIS: ICD-10-CM

## 2022-09-14 LAB — TSH SERPL DL<=0.005 MIU/L-ACNC: 1.66 UIU/ML (ref 0.3–4.2)

## 2022-09-14 PROCEDURE — 84443 ASSAY THYROID STIM HORMONE: CPT

## 2022-09-14 PROCEDURE — 36415 COLL VENOUS BLD VENIPUNCTURE: CPT

## 2022-10-04 ENCOUNTER — DOCUMENTATION ONLY (OUTPATIENT)
Dept: ENDOCRINOLOGY | Facility: CLINIC | Age: 52
End: 2022-10-04

## 2022-10-04 NOTE — PROGRESS NOTES
Nessa,    Wanted to follow-up with you.    You had viral thyroiditis, and based on your recent stable normal thyroid tests (including TSH normal on 9/14/2022) this has completely resolved and your thyroid function is normal.     Going forward I recommend:  1. Once yearly TSH assessment by your primary care provider because of your elevated TPO antibody. The elevated TPO Ab indicates your risk of thyroid dysfunction down the road is higher than it otherwise would be. If you have symptoms of an overactive or underactive thyroid at any point, then checking a TSH at that time and not waiting the full year is recommended.    If you have any questions now or down the road, please let us know. Otherwise, no follow-up with myself is needed at this time.     Take care,    Dr. Ragsdale

## 2022-10-14 ENCOUNTER — OFFICE VISIT (OUTPATIENT)
Dept: FAMILY MEDICINE | Facility: CLINIC | Age: 52
End: 2022-10-14
Payer: COMMERCIAL

## 2022-10-14 ENCOUNTER — TELEPHONE (OUTPATIENT)
Dept: FAMILY MEDICINE | Facility: CLINIC | Age: 52
End: 2022-10-14

## 2022-10-14 VITALS
WEIGHT: 161 LBS | BODY MASS INDEX: 29.63 KG/M2 | RESPIRATION RATE: 18 BRPM | TEMPERATURE: 97.6 F | HEART RATE: 70 BPM | SYSTOLIC BLOOD PRESSURE: 130 MMHG | DIASTOLIC BLOOD PRESSURE: 82 MMHG | HEIGHT: 62 IN

## 2022-10-14 DIAGNOSIS — M06.4 INFLAMMATORY POLYARTHRITIS (H): Primary | ICD-10-CM

## 2022-10-14 DIAGNOSIS — R19.7 DIARRHEA, UNSPECIFIED TYPE: ICD-10-CM

## 2022-10-14 DIAGNOSIS — E03.8 OTHER SPECIFIED HYPOTHYROIDISM: ICD-10-CM

## 2022-10-14 PROCEDURE — 99213 OFFICE O/P EST LOW 20 MIN: CPT | Performed by: FAMILY MEDICINE

## 2022-10-14 RX ORDER — HYDROXYCHLOROQUINE SULFATE 200 MG/1
200 TABLET, FILM COATED ORAL DAILY
COMMUNITY
End: 2023-09-13

## 2022-10-14 ASSESSMENT — PAIN SCALES - GENERAL: PAINLEVEL: NO PAIN (0)

## 2022-10-14 NOTE — PROGRESS NOTES
"  Assessment & Plan     Inflammatory polyarthritis (H)  Known to have inflammatory polyarthritis, following rheumatology, recommended to continue Plaquenil and rheumatology follow-ups.  Workability forms completed.    Other specified hypothyroidism  Following endocrinology, has an appointment with Dr Ragsdale next week    Diarrhea, unspecified type  Complains of loose stools for last 6 months, every other day or so.  No abdominal pain, nausea, vomiting or other relevant systemic symptoms.  Previous blood work-up reviewed.  Differential discussed in detail including irritable bowel syndrome.  Colonoscopy and GI referral placed for further review and recommendations.  - Adult GI  Referral - Consult Only; Future  - Adult GI  Referral - Procedure Only; Future      Follow-up in 3 months or earlier if needed.  All questions answered.         BMI:   Estimated body mass index is 29.45 kg/m  as calculated from the following:    Height as of this encounter: 1.575 m (5' 2\").    Weight as of this encounter: 73 kg (161 lb).   Weight management plan: Discussed healthy diet and exercise guidelines      Dinesh Blake MD  [facility]    David Szymanski is a 52 year old, presenting for the following health issues:  Thyroid Problem      History of Present Illness       Hypothyroidism:     Since last visit, patient describes the following symptoms::  Anxiety, Depression, Fatigue, Loose stools and Weight gain    Weight gain::  5 lbs.  Hypothyroidism comment:  Has [facility] paperwork for thyroid to fill out    She eats 2-3 servings of fruits and vegetables daily.She consumes 1 sweetened beverage(s) daily.She exercises with enough effort to increase her heart rate 30 to 60 minutes per day.  She exercises with enough effort to increase her heart rate 4 days per week.   She is taking medications regularly.         Review of Systems   Constitutional, HEENT, cardiovascular, pulmonary, GI, , " "musculoskeletal, neuro, skin, endocrine and psych systems are negative, except as otherwise noted.      Objective    /82 (Cuff Size: Adult Regular)   Pulse 70   Temp 97.6  F (36.4  C) (Tympanic)   Resp 18   Ht 1.575 m (5' 2\")   Wt 73 kg (161 lb)   LMP 09/28/2011   BMI 29.45 kg/m    Body mass index is 29.45 kg/m .  Physical Exam   GENERAL: alert, no distress and over weight  EYES: Eyes grossly normal to inspection, PERRL and conjunctivae and sclerae normal  NECK: no adenopathy, no asymmetry, masses, or scars and thyroid normal to palpation  RESP: lungs clear to auscultation - no rales, rhonchi or wheezes  CV: regular rate and rhythm, normal S1 S2, no S3 or S4, no murmur, click or rub, no peripheral edema and peripheral pulses strong  ABDOMEN: soft, nontender, no hepatosplenomegaly, no masses and bowel sounds normal  MS: no gross musculoskeletal defects noted, no edema  NEURO: Normal strength and tone, mentation intact and speech normal  PSYCH: mentation appears normal, anxious, judgement and insight intact and appearance well groomed      Wt Readings from Last 10 Encounters:   10/14/22 73 kg (161 lb)   07/06/22 69.2 kg (152 lb 8 oz)   06/02/22 69.9 kg (154 lb)   04/19/22 69.9 kg (154 lb)   03/09/22 68.5 kg (151 lb)   03/02/22 68.5 kg (151 lb)   09/01/20 67.1 kg (148 lb)   10/15/19 63.4 kg (139 lb 12.8 oz)   12/04/18 61.9 kg (136 lb 6.4 oz)   06/25/14 65.8 kg (145 lb)               "

## 2022-10-14 NOTE — TELEPHONE ENCOUNTER
FMLA Paper work [place] [employer]   Forms completed- Ask Pt if she would like to pick them up or have them faxed. If pt would like them to be faxed please ask her to provide the Fax number.  [facility]

## 2022-10-14 NOTE — NURSING NOTE
"Chief Complaint   Patient presents with     Thyroid Problem     /82 (Cuff Size: Adult Regular)   Pulse 70   Temp 97.6  F (36.4  C) (Tympanic)   Resp 18   Ht 1.575 m (5' 2\")   Wt 73 kg (161 lb)   LMP 09/28/2011   BMI 29.45 kg/m   Estimated body mass index is 29.45 kg/m  as calculated from the following:    Height as of this encounter: 1.575 m (5' 2\").    Weight as of this encounter: 73 kg (161 lb).  Patient presents to the clinic using No DME      Health Maintenance that is potentially due pending provider review:    Health Maintenance Due   Topic Date Due     ADVANCE CARE PLANNING  Never done     COVID-19 Vaccine (1) Never done     COLORECTAL CANCER SCREENING  Never done     ZOSTER IMMUNIZATION (1 of 2) Never done     YEARLY PREVENTIVE VISIT  12/04/2019     ASTHMA ACTION PLAN  06/16/2022     INFLUENZA VACCINE (1) Never done                "

## 2022-10-20 ENCOUNTER — VIRTUAL VISIT (OUTPATIENT)
Dept: ENDOCRINOLOGY | Facility: CLINIC | Age: 52
End: 2022-10-20
Payer: COMMERCIAL

## 2022-10-20 DIAGNOSIS — Z78.0 MENOPAUSE: ICD-10-CM

## 2022-10-20 DIAGNOSIS — R53.83 OTHER FATIGUE: ICD-10-CM

## 2022-10-20 DIAGNOSIS — R41.89 BRAIN FOG: ICD-10-CM

## 2022-10-20 DIAGNOSIS — E66.3 OVERWEIGHT (BMI 25.0-29.9): ICD-10-CM

## 2022-10-20 DIAGNOSIS — R19.7 DIARRHEA, UNSPECIFIED TYPE: ICD-10-CM

## 2022-10-20 DIAGNOSIS — E06.9 THYROIDITIS: Primary | ICD-10-CM

## 2022-10-20 DIAGNOSIS — R00.2 PALPITATIONS: ICD-10-CM

## 2022-10-20 DIAGNOSIS — R63.5 WEIGHT GAIN: ICD-10-CM

## 2022-10-20 PROCEDURE — 99213 OFFICE O/P EST LOW 20 MIN: CPT | Mod: TEL | Performed by: INTERNAL MEDICINE

## 2022-10-20 ASSESSMENT — ENCOUNTER SYMPTOMS
MYALGIAS: 1
NIGHT SWEATS: 0
EXERCISE INTOLERANCE: 0
EYE IRRITATION: 1
BACK PAIN: 0
DYSURIA: 0
POLYPHAGIA: 0
DECREASED APPETITE: 0
EYE PAIN: 0
NECK PAIN: 1
EYE WATERING: 1
BLOATING: 1
VOMITING: 0
JOINT SWELLING: 1
LOSS OF CONSCIOUSNESS: 0
HOARSE VOICE: 0
SWOLLEN GLANDS: 0
TROUBLE SWALLOWING: 0
NAUSEA: 0
HEARTBURN: 0
RECTAL PAIN: 0
HYPERTENSION: 0
FLANK PAIN: 0
MUSCLE WEAKNESS: 1
SEIZURES: 0
DECREASED CONCENTRATION: 1
ABDOMINAL PAIN: 0
SLEEP DISTURBANCES DUE TO BREATHING: 0
TASTE DISTURBANCE: 0
LIGHT-HEADEDNESS: 0
MUSCLE CRAMPS: 1
DEPRESSION: 1
ARTHRALGIAS: 1
INSOMNIA: 1
EYE REDNESS: 1
WEIGHT GAIN: 1
STIFFNESS: 1
FEVER: 0
DIZZINESS: 0
BRUISES/BLEEDS EASILY: 0
WEIGHT LOSS: 0
DIFFICULTY URINATING: 1
INCREASED ENERGY: 1
ALTERED TEMPERATURE REGULATION: 0
BLOOD IN STOOL: 0
FATIGUE: 1
PARALYSIS: 0
ORTHOPNEA: 0
DISTURBANCES IN COORDINATION: 0
SINUS PAIN: 0
HEADACHES: 1
DIARRHEA: 1
HALLUCINATIONS: 0
LEG PAIN: 0
NERVOUS/ANXIOUS: 1
JAUNDICE: 0
PANIC: 0
CONSTIPATION: 1
TREMORS: 0
SORE THROAT: 0
MEMORY LOSS: 1
SMELL DISTURBANCE: 0
SINUS CONGESTION: 1
SYNCOPE: 0
TINGLING: 0
HYPOTENSION: 0
POLYDIPSIA: 0
HEMATURIA: 0
BOWEL INCONTINENCE: 0
WEAKNESS: 0
NUMBNESS: 0
CHILLS: 0
PALPITATIONS: 1

## 2022-10-20 NOTE — PROGRESS NOTES
Phone visit    Start time 11:50, stop time 12:11; additional 10 minutes spent on the date of the encounter doing chart review, documentation and further activities as noted.     Provider location: [facility], [address] [place] [address]    Patient location: patient home    Mode of transmission: phone    Subjective:    Established patient. The following is a comprehensive summary of her Endocrine care to date.    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        3/2022: TSI undetectable  4/2022: TPO Ab elevated at 94 (ULN 6 IU/mL), TRAb within the normal range     11/2021: she had several week of upper respiratory tract symptoms  COVID-19 positive 1/24/2022.     She developed symptoms around mid 2/2022 including palpitations, insomnia, anxiety, tremors, hyper defecation. She did not have any neck pain, swelling, tenderness.     At this point (4/19/2022) symptoms are present but significantly improved.     The lab draw 3/2/2022 was the first time she was found to have thyroid dysfunction. She had never previously been on thyroid hormone therapy or ATD. No prior thyroid surgeries. No prior H/N radiation. No prior JEREZ therapy.     3/18/2022: NM U/S, JEREZ uptake at 24 hours: 0.8%. Homogeneous isotope distribution in a gland which is difficult to see due to the low radiotracer uptake. I reviewed the images in detail and agree with the radiology read.     Methimazole 5 mg daily was started 4/8/2022. She is on metoprolol 25 mg BID. When I saw her 4/19/2022 I recommended stopping methimazole in the setting of thyroiditis.    Thyroid US 4/19/2022: I reviewed the images and agree with the radiology read as below  -RIGHT lobe: 4.9 x 1.7 x 1.3 cm. Mildly heterogeneous echotexture without focal nodule. Color Doppler demonstrates hyperemia.  -Isthmus: 3 mm.  -LEFT lobe: 5.1 x 1.7 x 1.2 cm. Mildly heterogeneous echotexture without focal nodule. Color Doppler demonstrates  mild hyperemia.  -NECK: No cervical lymphadenopathy.     She has no known history of thyroid nodules.     She does not take iodine, or eat seaweed or kelp. No recent IV contrast exposure.     No FH of thyroid disease.     She has had ocular symptoms for the past 1 year, she has an upcoming ophthalmology appointment.     She takes a MVI but no biotin supplement.     Rare tobacco use. No alcohol use.    She has a known inflammatory polyarthritis.    10/20/2022: Despite normalization of her thyroid function she has ongoing symptoms.     1. Weight gain - 30 # over the past 11 months and she is continuing to gain weight. She started a 1500 calorie/day restriction (per her estimation) but is not currently tracking calories. Her father had pancreatic cancer. No prior nephrolithiasis.   2. She has diarrhea - upcoming colonoscopy   3. She has ongoing palpitations and brain fog as well as chronic fatigue (starting during her infection with COVID-19). For her inflammatory polyarthritis she took prednisone 10 mg daily for 6 weeks and stopped it a few weeks ago. The chronic fatigue didn't significantly improve with prednisone.      She had a hysterectomy (ovaries left in situ) and hot flashes started about 1 year ago. She was not given estrogen.     Objective:    Phone visit today.    4/2022: BMI 28.17 kg/meters squared.  Blood pressure 110/80.  She appears well.  Regular rate and rhythm.  She does have conjunctival inflammation and using an exophthalmometer I measured the right eye at 20 mm and left at 19 mm with upper limit of normal in a  woman being 19 mm.  Clinical activity score is at most 1.  The thyroid is not inflamed.  There is some fullness in the lower anterior right neck but no discrete thyroid nodularity.  No cervical lymphadenopathy.  Breathing comfortably on room air.  No significant lower extremity edema.    Assessment/Plan:    # Thyroiditis, resolved     She is euthyroid, thyroiditis (likely due to  COVID-19) has resolved.    Going forward I recommend:    Once yearly TSH assessment by your primary care provider because of your elevated TPO antibody. The elevated TPO Ab indicates your risk of thyroid dysfunction down the road is higher than it otherwise would be. If you have symptoms of an overactive or underactive thyroid at any point, then checking a TSH at that time and not waiting the full year is recommended.    # Weight gain    We reviewed management strategies for weight gain.  Suspect that her thyroid abnormalities and prednisone could have been contributing and now that she is off prednisone and she is euthyroid this may help.  She will start strictly tracking calories using a phone application and check in with me in a few weeks.  We did briefly review the role of pharmacologic therapy to augment weight loss but given her current symptoms and family history of pancreatic cancer the options are somewhat limited.    # Constellation of symptoms    For her chronic fatigue and brain fog since COVID-19 infection I recommended that she explores the possibility of long COVID with her primary physician.  Difficult to say if her symptoms could be related to hormonal changes in the setting of perimenopause and transdermal estrogen could also be considered.

## 2022-10-20 NOTE — LETTER
10/20/2022       RE: Nessa Briceño  [address] [facility] [address]     Dear Colleague,    Thank you for referring your patient, Nessa Briceño, to the [facility] [place] [facility]. Please see a copy of my visit note below.    Phone visit    Start time 11:50, stop time 12:11; additional 10 minutes spent on the date of the encounter doing chart review, documentation and further activities as noted.     Provider location: [facility], [address] [place], [place] [address]    Patient location: patient home    Mode of transmission: phone    Subjective:    Established patient. The following is a comprehensive summary of her Endocrine care to date.    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        3/2022: TSI undetectable  4/2022: TPO Ab elevated at 94 (ULN 6 IU/mL), TRAb within the normal range     11/2021: she had several week of upper respiratory tract symptoms  COVID-19 positive 1/24/2022.     She developed symptoms around mid 2/2022 including palpitations, insomnia, anxiety, tremors, hyper defecation. She did not have any neck pain, swelling, tenderness.     At this point (4/19/2022) symptoms are present but significantly improved.     The lab draw 3/2/2022 was the first time she was found to have thyroid dysfunction. She had never previously been on thyroid hormone therapy or ATD. No prior thyroid surgeries. No prior H/N radiation. No prior JEREZ therapy.     3/18/2022: NM U/S, JEREZ uptake at 24 hours: 0.8%. Homogeneous isotope distribution in a gland which is difficult to see due to the low radiotracer uptake. I reviewed the images in detail and agree with the radiology read.     Methimazole 5 mg daily was started 4/8/2022. She is on metoprolol 25 mg BID. When I saw her 4/19/2022 I recommended stopping methimazole in the setting of thyroiditis.    Thyroid US 4/19/2022: I  reviewed the images and agree with the radiology read as below  -RIGHT lobe: 4.9 x 1.7 x 1.3 cm. Mildly heterogeneous echotexture without focal nodule. Color Doppler demonstrates hyperemia.  -Isthmus: 3 mm.  -LEFT lobe: 5.1 x 1.7 x 1.2 cm. Mildly heterogeneous echotexture without focal nodule. Color Doppler demonstrates mild hyperemia.  -NECK: No cervical lymphadenopathy.     She has no known history of thyroid nodules.     She does not take iodine, or eat seaweed or kelp. No recent IV contrast exposure.     No FH of thyroid disease.     She has had ocular symptoms for the past 1 year, she has an upcoming ophthalmology appointment.     She takes a MVI but no biotin supplement.     Rare tobacco use. No alcohol use.    She has a known inflammatory polyarthritis.    10/20/2022: Despite normalization of her thyroid function she has ongoing symptoms.     1. Weight gain - 30 # over the past 11 months and she is continuing to gain weight. She started a 1500 calorie/day restriction (per her estimation) but is not currently tracking calories. Her father had pancreatic cancer. No prior nephrolithiasis.   2. She has diarrhea - upcoming colonoscopy   3. She has ongoing palpitations and brain fog as well as chronic fatigue (starting during her infection with COVID-19). For her inflammatory polyarthritis she took prednisone 10 mg daily for 6 weeks and stopped it a few weeks ago. The chronic fatigue didn't significantly improve with prednisone.      She had a hysterectomy (ovaries left in situ) and hot flashes started about 1 year ago. She was not given estrogen.     Objective:    Phone visit today.    4/2022: BMI 28.17 kg/meters squared.  Blood pressure 110/80.  She appears well.  Regular rate and rhythm.  She does have conjunctival inflammation and using an exophthalmometer I measured the right eye at 20 mm and left at 19 mm with upper limit of normal in a  woman being 19 mm.  Clinical activity score is at most 1.  The  thyroid is not inflamed.  There is some fullness in the lower anterior right neck but no discrete thyroid nodularity.  No cervical lymphadenopathy.  Breathing comfortably on room air.  No significant lower extremity edema.    Assessment/Plan:    # Thyroiditis, resolved     She is euthyroid, thyroiditis (likely due to COVID-19) has resolved.    Going forward I recommend:    Once yearly TSH assessment by your primary care provider because of your elevated TPO antibody. The elevated TPO Ab indicates your risk of thyroid dysfunction down the road is higher than it otherwise would be. If you have symptoms of an overactive or underactive thyroid at any point, then checking a TSH at that time and not waiting the full year is recommended.    # Weight gain    We reviewed management strategies for weight gain.  Suspect that her thyroid abnormalities and prednisone could have been contributing and now that she is off prednisone and she is euthyroid this may help.  She will start strictly tracking calories using a phone application and check in with me in a few weeks.  We did briefly review the role of pharmacologic therapy to augment weight loss but given her current symptoms and family history of pancreatic cancer the options are somewhat limited.    # Constellation of symptoms    For her chronic fatigue and brain fog since COVID-19 infection I recommended that she explores the possibility of long COVID with her primary physician.  Difficult to say if her symptoms could be related to hormonal changes in the setting of perimenopause and transdermal estrogen could also be considered.       Sincerely,    Jean-Claude Ragsdale MD

## 2022-11-16 ENCOUNTER — TELEPHONE (OUTPATIENT)
Dept: GASTROENTEROLOGY | Facility: CLINIC | Age: 52
End: 2022-11-16

## 2022-11-16 NOTE — TELEPHONE ENCOUNTER
Screening Questions  BLUE  KIND OF PREP RED  LOCATION [review exclusion criteria] GREEN  SEDATION TYPE        Y Are you active on mychart?       Dinesh Blake MD Ordering/Referring Provider?        NA What type of coverage do you have?      N Have you had a positive covid test in the last 90 days?     29.45 1. BMI  [BMI 40+ - review exclusion criteria]    Y  2. Are you able to give consent for your medical care? [IF NO,RN REVIEW]        N  3. Are you taking any prescription pain medications on a routine schedule?      N  3a. EXTENDED PREP What kind of prescription?     N 4. Do you have any chemical dependencies such as alcohol, street drugs, or methadone?    Y-WORKS IN A [facility]-IT IS MANAGEABLE 5. Do you have any history of post-traumatic stress syndrome, severe anxiety or history of psychosis?      **If yes 3- 5 , please schedule with MAC sedation.**          IF YES TO ANY 6 - 10 - HOSPITAL SETTING ONLY.     N 6.   Do you need assistance transferring?     N 7.   Have you had a heart or lung transplant?    N 8.   Are you currently on dialysis?   N 9.   Do you use daily home oxygen?   N 10. Do you take nitroglycerin?   10a. N If yes, how often?     11. [FEMALES]  N Are you currently pregnant?    11a. N If yes, how many weeks? [ Greater than 12 weeks, OR NEEDED]    N 12. Do you have Pulmonary Hypertension? *NEED PAC APPT AT [facility]*     N 13. [review exclusion criteria]  Do you have any implantable devices in your body (pacemaker, defib, LVAD)?    N 14. In the past 6 months, have you had any heart related issues including cardiomyopathy or heart attack?     14a. N If yes, did it require cardiac stenting if so when?     N 15. Have you had a stroke or Transient ischemic attack (TIA - aka  mini stroke ) within 6 months?      N 16. Do you have mod to severe Obstructive Sleep Apnea?  [Hospital only - [facility] at [facility]]    N 17. Do you have SEVERE AND UNCONTROLLED asthma? *NEED PAC APPT AT [facility]*     N 18. Are you  "currently taking any blood thinners?     18a. If yes, inform patient to \"follow up w/ ordering provider for bridging instructions.\"    N 19. Do you take the medication Phentermine?    19a. If yes, \"Hold for 7 days before procedure.  Please consult your prescribing provider if you have questions about holding this medication.\"     N  20. Do you have chronic kidney disease?      N  21. Do you have a diagnosis of diabetes?     N  22. On a regular basis do you go 3-5 days between bowel movements?     Y 23. Preferred LOCAL Pharmacy for Pre Prescription    [ LIST ONLY ONE PHARMACY]          [facility] - [place], [place] - [address]        - CLOSING REMINDERS -    Informed patient they will need an adult    Cannot take any type of public or medical transportation alone    Conscious Sedation- Needs  for 6 hours after the procedure       MAC/General-Needs  for 24 hours after procedure    Pre-Procedure Covid test to be completed [[facility] PCR Testing Required]    Confirmed Nurse will call to complete assessment       - SCHEDULING DETAILS -     DR. MEEHAN  Surgeon    1/3/2023  Date of Procedure  Lower Endoscopy [Colonoscopy]  Type of Procedure Scheduled  [facility]-If you answer yes to questions #8, #20, #21Which Colonoscopy Prep was Sent?     MAC Sedation Type     NA PAC / Pre-op Required         Additional comments:  CARDIAC [id number] OUT DUE TO THYROID        "

## 2022-12-29 RX ORDER — BISACODYL 5 MG
TABLET, DELAYED RELEASE (ENTERIC COATED) ORAL
Qty: 4 TABLET | Refills: 0 | Status: SHIPPED | OUTPATIENT
Start: 2022-12-29 | End: 2023-05-18

## 2022-12-30 ENCOUNTER — ANESTHESIA EVENT (OUTPATIENT)
Dept: GASTROENTEROLOGY | Facility: CLINIC | Age: 52
End: 2022-12-30
Payer: COMMERCIAL

## 2022-12-30 ASSESSMENT — LIFESTYLE VARIABLES: TOBACCO_USE: 1

## 2022-12-30 NOTE — ANESTHESIA PREPROCEDURE EVALUATION
Anesthesia Pre-Procedure Evaluation    Patient: Nessa Briceño   MRN: [id number] : 1970        Procedure : Procedure(s):  COLONOSCOPY          Past Medical History:   Diagnosis Date     Abnormal glandular Papanicolaou smear of cervix       Past Surgical History:   Procedure Laterality Date     HC REDUCTION OF LARGE BREAST  1997     HYSTERECTOMY VAGINAL Bilateral 2017     KNEE SURGERY       SURGICAL HISTORY OF -   1978    Rt knee X 2      Allergies   Allergen Reactions     Oxycodone-Acetaminophen Other (See Comments)     Hallucinations      Social History     Tobacco Use     Smoking status: Former     Types: Cigarettes     Quit date:      Years since quittin.0     Smokeless tobacco: Never     Tobacco comments:     smoked once every few months, a few cigs.   Substance Use Topics     Alcohol use: Yes     Comment: occ      Wt Readings from Last 1 Encounters:   10/14/22 73 kg (161 lb)        Anesthesia Evaluation   Pt has had prior anesthetic. Type: General, MAC and Regional.    No history of anesthetic complications       ROS/MED HX  ENT/Pulmonary:     (+) tobacco use, Past use, Intermittent, asthma Treatment: Inhaler prn and Nebulizer prn,      Neurologic:  - neg neurologic ROS     Cardiovascular:     (+) Dyslipidemia hypertension-----Previous cardiac testing   Echo: Date: 2022 Results:  The visual ejection fraction is estimated at 75-80%.  Hyperdynamic left ventricular function  Probably no regional wall motion abnormality present. However, the apical two  chamber view was foreshortened and subtle regional wall motion abnormalities  could be missed on this study. Contrast would enhance image quality.  The study was technically difficult.  Stress Test: Date: Results:    ECG Reviewed: Date: Results:    Cath: Date: Results:      METS/Exercise Tolerance:     Hematologic:  - neg hematologic  ROS     Musculoskeletal:   (+) arthritis,     GI/Hepatic:     (+) GERD,     Renal/Genitourinary:  -  neg Renal ROS     Endo: Comment: Low TSH levels    (+) Obesity,     Psychiatric/Substance Use:  - neg psychiatric ROS     Infectious Disease:  - neg infectious disease ROS     Malignancy:  - neg malignancy ROS     Other:  - neg other ROS    (+) , H/O Chronic Pain (Hip pain, neck pain),        Physical Exam    Airway  airway exam normal           Respiratory Devices and Support         Dental  no notable dental history         Cardiovascular   cardiovascular exam normal          Pulmonary   pulmonary exam normal                OUTSIDE LABS:  CBC:   Lab Results   Component Value Date    WBC 6.9 07/06/2022    WBC 5.7 03/02/2022    HGB 13.9 07/06/2022    HGB 13.9 03/02/2022    HCT 41.0 07/06/2022    HCT 42.0 03/02/2022     07/06/2022     03/02/2022     BMP:   Lab Results   Component Value Date     03/02/2022     12/04/2018    POTASSIUM 4.3 03/02/2022    POTASSIUM 3.8 12/04/2018    CHLORIDE 108 03/02/2022    CHLORIDE 107 12/04/2018    CO2 28 03/02/2022    CO2 31 12/04/2018    BUN 19 03/02/2022    BUN 15 12/04/2018    CR 0.83 07/06/2022    CR 0.64 03/02/2022    GLC 94 03/02/2022    GLC 77 12/04/2018     COAGS:   Lab Results   Component Value Date    PTT 29 10/13/2005    INR 0.94 10/13/2005     POC:   Lab Results   Component Value Date     (H) 06/29/2009    HCG  06/29/2009     Negative   This test provides a presumptive diagnosis of pregnancy. A confirmed pregnancy   diagnosis should only be made by a physician after all clinical and laboratory   findings have been evaluated.     HEPATIC:   Lab Results   Component Value Date    ALBUMIN 4.3 07/06/2022    PROTTOTAL 7.7 02/07/2012    ALT 28 07/06/2022    AST 19 02/07/2012    ALKPHOS 60 02/07/2012    BILITOTAL 0.3 02/07/2012     OTHER:   Lab Results   Component Value Date    A1C 5.5 03/02/2022    JENNIFER 9.1 03/02/2022    TSH 1.66 09/14/2022    T4 0.81 05/24/2022    T3 147 05/24/2022    CRP 14.70 (H) 07/06/2022    SED 22 (H) 07/06/2022        Anesthesia Plan    ASA Status:  3   NPO Status:  NPO Appropriate    Anesthesia Type: General.     - Airway: Native airway      Maintenance: Balanced.        Consents    Anesthesia Plan(s) and associated risks, benefits, and realistic alternatives discussed. Questions answered and patient/representative(s) expressed understanding.     - Discussed: Risks, Benefits and Alternatives for BOTH SEDATION and the PROCEDURE were discussed     - Discussed with:  Patient      - Extended Intubation/Ventilatory Support Discussed: No.      - Patient is DNR/DNI Status: No    Use of blood products discussed: No .     Postoperative Care            Comments:                SILVERIO Flores CRNA

## 2023-01-03 ENCOUNTER — ANESTHESIA (OUTPATIENT)
Dept: GASTROENTEROLOGY | Facility: CLINIC | Age: 53
End: 2023-01-03
Payer: COMMERCIAL

## 2023-01-03 ENCOUNTER — HOSPITAL ENCOUNTER (OUTPATIENT)
Facility: CLINIC | Age: 53
Discharge: HOME | End: 2023-01-03
Attending: SURGERY | Admitting: SURGERY
Payer: COMMERCIAL

## 2023-01-03 VITALS
HEART RATE: 100 BPM | BODY MASS INDEX: 29.63 KG/M2 | OXYGEN SATURATION: 97 % | WEIGHT: 161 LBS | HEIGHT: 62 IN | RESPIRATION RATE: 17 BRPM | DIASTOLIC BLOOD PRESSURE: 87 MMHG | SYSTOLIC BLOOD PRESSURE: 138 MMHG | TEMPERATURE: 98.1 F

## 2023-01-03 DIAGNOSIS — Z12.11 SPECIAL SCREENING FOR MALIGNANT NEOPLASMS, COLON: Primary | ICD-10-CM

## 2023-01-03 LAB — COLONOSCOPY: NORMAL

## 2023-01-03 PROCEDURE — 45385 COLONOSCOPY W/LESION REMOVAL: CPT

## 2023-01-03 PROCEDURE — 250N000009 HC RX 250: Performed by: NURSE ANESTHETIST, CERTIFIED REGISTERED

## 2023-01-03 PROCEDURE — 250N000011 HC RX IP 250 OP 636: Performed by: NURSE ANESTHETIST, CERTIFIED REGISTERED

## 2023-01-03 PROCEDURE — 45378 DIAGNOSTIC COLONOSCOPY: CPT | Performed by: SURGERY

## 2023-01-03 PROCEDURE — 370N000017 HC ANESTHESIA TECHNICAL FEE, PER MIN: Performed by: SURGERY

## 2023-01-03 PROCEDURE — 258N000003 HC RX IP 258 OP 636: Performed by: SURGERY

## 2023-01-03 PROCEDURE — 45385 COLONOSCOPY W/LESION REMOVAL: CPT | Performed by: SURGERY

## 2023-01-03 PROCEDURE — 88305 TISSUE EXAM BY PATHOLOGIST: CPT | Mod: 26 | Performed by: PATHOLOGY

## 2023-01-03 PROCEDURE — 88305 TISSUE EXAM BY PATHOLOGIST: CPT | Mod: TC | Performed by: SURGERY

## 2023-01-03 PROCEDURE — 45380 COLONOSCOPY AND BIOPSY: CPT | Mod: XU | Performed by: SURGERY

## 2023-01-03 RX ORDER — SODIUM CHLORIDE, SODIUM LACTATE, POTASSIUM CHLORIDE, CALCIUM CHLORIDE 600; 310; 30; 20 MG/100ML; MG/100ML; MG/100ML; MG/100ML
INJECTION, SOLUTION INTRAVENOUS CONTINUOUS
Status: DISCONTINUED | OUTPATIENT
Start: 2023-01-03 | End: 2023-01-03 | Stop reason: HOSPADM

## 2023-01-03 RX ORDER — ONDANSETRON 4 MG/1
4 TABLET, ORALLY DISINTEGRATING ORAL EVERY 30 MIN PRN
Status: DISCONTINUED | OUTPATIENT
Start: 2023-01-03 | End: 2023-01-03 | Stop reason: HOSPADM

## 2023-01-03 RX ORDER — PROPOFOL 10 MG/ML
INJECTION, EMULSION INTRAVENOUS CONTINUOUS PRN
Status: DISCONTINUED | OUTPATIENT
Start: 2023-01-03 | End: 2023-01-03

## 2023-01-03 RX ORDER — LIDOCAINE HYDROCHLORIDE 10 MG/ML
INJECTION, SOLUTION INFILTRATION; PERINEURAL PRN
Status: DISCONTINUED | OUTPATIENT
Start: 2023-01-03 | End: 2023-01-03

## 2023-01-03 RX ORDER — GLYCOPYRROLATE 0.2 MG/ML
INJECTION, SOLUTION INTRAMUSCULAR; INTRAVENOUS PRN
Status: DISCONTINUED | OUTPATIENT
Start: 2023-01-03 | End: 2023-01-03

## 2023-01-03 RX ORDER — ONDANSETRON 2 MG/ML
4 INJECTION INTRAMUSCULAR; INTRAVENOUS EVERY 30 MIN PRN
Status: DISCONTINUED | OUTPATIENT
Start: 2023-01-03 | End: 2023-01-03 | Stop reason: HOSPADM

## 2023-01-03 RX ADMIN — PROPOFOL 200 MCG/KG/MIN: 10 INJECTION, EMULSION INTRAVENOUS at 12:35

## 2023-01-03 RX ADMIN — SODIUM CHLORIDE, POTASSIUM CHLORIDE, SODIUM LACTATE AND CALCIUM CHLORIDE: 600; 310; 30; 20 INJECTION, SOLUTION INTRAVENOUS at 10:56

## 2023-01-03 RX ADMIN — GLYCOPYRROLATE 0.2 MG: 0.2 INJECTION, SOLUTION INTRAMUSCULAR; INTRAVENOUS at 12:35

## 2023-01-03 RX ADMIN — LIDOCAINE HYDROCHLORIDE 30 MG: 10 INJECTION, SOLUTION INFILTRATION; PERINEURAL at 12:35

## 2023-01-03 ASSESSMENT — ACTIVITIES OF DAILY LIVING (ADL)
ADLS_ACUITY_SCORE: 35
ADLS_ACUITY_SCORE: 35

## 2023-01-03 NOTE — H&P
ENDOSCOPY PRE-SEDATION H&P FOR OUTPATIENT PROCEDURES    Nessa Briceño  [id number]  1970    Procedure:   Colonoscopy possible biopsy, possible polypectomy, with MAC sedation.     Pre-procedure diagnosis: chronic diarrhea    Past medical history:   Past Medical History:   Diagnosis Date     Abnormal glandular Papanicolaou smear of cervix      Arthritis      Uncomplicated asthma        Past surgical history:   Past Surgical History:   Procedure Laterality Date     HC REDUCTION OF LARGE BREAST  1997     HYSTERECTOMY VAGINAL Bilateral 12/07/2017     KNEE SURGERY       SURGICAL HISTORY OF -   1978 1991    Rt knee X 2       Current Facility-Administered Medications   Medication     lactated ringers infusion       Allergies   Allergen Reactions     Oxycodone-Acetaminophen Other (See Comments)     Hallucinations       History of Anesthesia/Sedation Problems: no    Physical Exam:    Mental status: alert  Heart: Normal  Lung: Normal  Assessment of patient's airway: Normal  Other as pertinent for procedure: None     ASA Score: See Provation note    Mallampati score:  I - Faucial pillars, soft palate, and uvula are visible    Assessment/Plan:     The patient is an appropriate candidate to receive sedation.    Informed consent was discussed with the patient/family, including the risks, benefits, potential complications and any alternative options associated with sedation.    Patient assessment completed just prior to sedation and while under constant observation by the provider. Condition determined to be adequate for proceeding with sedation.    The specific risks for the procedure were discussed with the patient at the time of informed consent and include but are not limited to perforation which could require surgery, missing significant neoplasm or lesion, hemorrhage and adverse sedative complication.      Ki Cee MD

## 2023-01-03 NOTE — LETTER
[facility]           [address] [facility]           [place] [address]           Tel [phone]  Nessa Briceño  [address] [place] [id number]      January 5, 2023    Dear Nessa,  This letter is to inform you of the results of your pathology report on your colonoscopy.  If you have questions please feel free to call:  Please call [phone], for  [facility] or  for [facility], to schedule a follow up appointment in 2 weeks.   Your pathology report was:     A: Large intestine, random, biopsy:  - Colonic mucosa without diagnostic abnormality.  - No sufficient features of colitis (microscopic or otherwise), polyp, dysplasia, or malignancy.     So no good reason for your diarrhea.    You may benefit from seeing a Gastrointestinal Doctor.  There are 2 of them here at [facility].  Dr. Delvalle and Dr. Casey.    You can call  and ask for the  Gastrointestinal clinic.   Or the number below.    [facility]: Gastroenterology Clinic [facility] [phone]   [email or web address]  Showed an Adenomatous polyp. This is a benign (not cancerous) growth; however these can become cancer over time. This polyp is usually removed completely at the time of the biopsy. Because it is an Adenomatous polyp you do have a slight higher risk for colon cancer. This is why you will need a repeat colonoscopy in approximately 5 years.  If you do have further questions please don t hesitate to call the below number.    To make an appointment call:  Please call [phone], for  [facility] or  for [facility], to schedule a follow up appointment in 2 weeks.     Sincerely,     Ki Cee M.D.  ___

## 2023-01-03 NOTE — ADDENDUM NOTE
Addendum  created 01/03/23 1401 by Razia Nj APRN CRNA    Flowsheet accepted, Intraprocedure Event edited, Intraprocedure Staff edited

## 2023-01-03 NOTE — ANESTHESIA POSTPROCEDURE EVALUATION
Patient: Nessa rBiceño    Procedure: Procedure(s):  COLONOSCOPY with biopsies and polypectomy       Anesthesia Type:  No value filed.    Note:  Disposition: Outpatient   Postop Pain Control: Uneventful            Sign Out: Well controlled pain   PONV: No   Neuro/Psych: Uneventful            Sign Out: Acceptable/Baseline neuro status   Airway/Respiratory: Uneventful            Sign Out: Acceptable/Baseline resp. status   CV/Hemodynamics: Uneventful            Sign Out: Acceptable CV status; No obvious hypovolemia; No obvious fluid overload   Other NRE: NONE   DID A NON-ROUTINE EVENT OCCUR? No           Last vitals:  Vitals:    01/03/23 0955   BP: (!) 141/95   Pulse: 82   Resp: 16   Temp: 36.8  C (98.3  F)   SpO2: 97%       Electronically Signed By: SILVERIO Gómez CRNA  January 3, 2023  1:09 PM

## 2023-01-03 NOTE — ANESTHESIA CARE TRANSFER NOTE
Patient: Nessa Briceño    Procedure: Procedure(s):  COLONOSCOPY with biopsies and polypectomy       Diagnosis: Diarrhea, unspecified type [R19.7]  Diagnosis Additional Information: No value filed.    Anesthesia Type:   No value filed.     Note:    Oropharynx: oropharynx clear of all foreign objects and spontaneously breathing  Level of Consciousness: awake  Oxygen Supplementation: room air      Dentition: dentition unchanged  Vital Signs Stable: post-procedure vital signs reviewed and stable  Report to RN Given: handoff report given  Patient transferred to: Phase II    Handoff Report: Identifed the Patient, Identified the Reponsible Provider, Reviewed the pertinent medical history, Discussed the surgical course, Reviewed Intra-OP anesthesia mangement and issues during anesthesia, Set expectations for post-procedure period and Allowed opportunity for questions and acknowledgement of understanding      Vitals:  Vitals Value Taken Time   BP     Temp     Pulse     Resp     SpO2         Electronically Signed By: SILVERIO Gómez CRNA  January 3, 2023  1:08 PM

## 2023-01-05 LAB
PATH REPORT.COMMENTS IMP SPEC: NORMAL
PATH REPORT.COMMENTS IMP SPEC: NORMAL
PATH REPORT.FINAL DX SPEC: NORMAL
PATH REPORT.GROSS SPEC: NORMAL
PATH REPORT.MICROSCOPIC SPEC OTHER STN: NORMAL
PATH REPORT.RELEVANT HX SPEC: NORMAL
PHOTO IMAGE: NORMAL

## 2023-05-18 ENCOUNTER — ANCILLARY PROCEDURE (OUTPATIENT)
Dept: GENERAL RADIOLOGY | Facility: CLINIC | Age: 53
End: 2023-05-18
Attending: FAMILY MEDICINE
Payer: COMMERCIAL

## 2023-05-18 ENCOUNTER — OFFICE VISIT (OUTPATIENT)
Dept: FAMILY MEDICINE | Facility: CLINIC | Age: 53
End: 2023-05-18
Payer: COMMERCIAL

## 2023-05-18 VITALS
OXYGEN SATURATION: 98 % | HEART RATE: 75 BPM | RESPIRATION RATE: 18 BRPM | DIASTOLIC BLOOD PRESSURE: 86 MMHG | WEIGHT: 159 LBS | HEIGHT: 62 IN | BODY MASS INDEX: 29.26 KG/M2 | SYSTOLIC BLOOD PRESSURE: 130 MMHG | TEMPERATURE: 97.1 F

## 2023-05-18 DIAGNOSIS — M06.4 INFLAMMATORY POLYARTHRITIS (H): ICD-10-CM

## 2023-05-18 DIAGNOSIS — Z12.31 VISIT FOR SCREENING MAMMOGRAM: ICD-10-CM

## 2023-05-18 DIAGNOSIS — M25.561 ACUTE PAIN OF RIGHT KNEE: ICD-10-CM

## 2023-05-18 DIAGNOSIS — M25.561 ACUTE PAIN OF RIGHT KNEE: Primary | ICD-10-CM

## 2023-05-18 PROCEDURE — 99213 OFFICE O/P EST LOW 20 MIN: CPT | Performed by: FAMILY MEDICINE

## 2023-05-18 PROCEDURE — 73562 X-RAY EXAM OF KNEE 3: CPT | Mod: TC | Performed by: RADIOLOGY

## 2023-05-18 ASSESSMENT — ASTHMA QUESTIONNAIRES
QUESTION_5 LAST FOUR WEEKS HOW WOULD YOU RATE YOUR ASTHMA CONTROL: COMPLETELY CONTROLLED
ACT_TOTALSCORE: 22
QUESTION_1 LAST FOUR WEEKS HOW MUCH OF THE TIME DID YOUR ASTHMA KEEP YOU FROM GETTING AS MUCH DONE AT WORK, SCHOOL OR AT HOME: NONE OF THE TIME
QUESTION_3 LAST FOUR WEEKS HOW OFTEN DID YOUR ASTHMA SYMPTOMS (WHEEZING, COUGHING, SHORTNESS OF BREATH, CHEST TIGHTNESS OR PAIN) WAKE YOU UP AT NIGHT OR EARLIER THAN USUAL IN THE MORNING: ONCE A WEEK
ACT_TOTALSCORE: 22
QUESTION_4 LAST FOUR WEEKS HOW OFTEN HAVE YOU USED YOUR RESCUE INHALER OR NEBULIZER MEDICATION (SUCH AS ALBUTEROL): NOT AT ALL
QUESTION_2 LAST FOUR WEEKS HOW OFTEN HAVE YOU HAD SHORTNESS OF BREATH: ONCE OR TWICE A WEEK

## 2023-05-18 ASSESSMENT — PAIN SCALES - GENERAL: PAINLEVEL: SEVERE PAIN (7)

## 2023-05-18 NOTE — PROGRESS NOTES
"52 yr old female here for acute right knee pain. 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 She says she was twisting and turning and afterward started to have pain in her right knee. There was some swelling and no redness. She reports no injury.  She has been icing and also wearing a knee brace.   Assessment & Plan     (M25.561) Acute pain of right knee  (primary encounter diagnosis)  Comment: x-rays were normal . MRI ordered.  Plan: XR Knee Right 3 Views, MR Knee Right w/o         Contrast            (Z12.31) Visit for screening mammogram  Comment: patient reminded of mammogram  Plan: MA SCREENING DIGITAL BILAT - Future  (s+30)            (M06.4) Inflammatory polyarthritis (H)  Comment: stable, sees rheumatology for this.  Plan: as above    Review of external notes as documented elsewhere in note         BMI:   Estimated body mass index is 29.08 kg/m  as calculated from the following:    Height as of this encounter: 1.575 m (5' 2\").    Weight as of this encounter: 72.1 kg (159 lb).       FUTURE APPOINTMENTS:       - Follow-up visit as needed.  Patient will be notified of results.     Ian Christiansen MD  [facility]    David Szymanski is a 52 year old, presenting for the following health issues:  Knee Pain (Right knee pain.  This started 3 weeks ago.  She was working with getting things out of a cart and handing them to people.  Had a monitor behind her.  She felt fine with that.  The next day she woke up with the pain.) and Imm/Inj (Mentioned to her about checking with her Insurance about the shingles vaccine if interested for coverage.)        5/18/2023    11:02 AM   Additional Questions   Roomed by Jordyn Schultz CMA     Chief Complaint   Patient presents with     Knee Pain     Right knee pain.  This started 3 weeks ago.  She was working with getting things out of a cart and " "handing them to people.  Had a monitor behind her.  She felt fine with that.  The next day she woke up with the pain.     Imm/Inj     Mentioned to her about checking with her Insurance about the shingles vaccine if interested for coverage.       Knee Pain  Associated symptoms include arthralgias.   History of Present Illness       Reason for visit:  Right knee pain  Symptom onset:  3-4 weeks ago  Symptoms include:  Pain, weakness, clicking  Symptom intensity:  Severe  Symptom progression:  Staying the same  Had these symptoms before:  No  What makes it worse:  Walking, twisting  What makes it better:  Not really    She eats 2-3 servings of fruits and vegetables daily.She consumes 1 sweetened beverage(s) daily.She exercises with enough effort to increase her heart rate 20 to 29 minutes per day.  She exercises with enough effort to increase her heart rate 4 days per week.   She is taking medications regularly.               Review of Systems   Constitutional: Negative.    HENT: Negative.    Eyes: Negative.    Respiratory: Negative.    Cardiovascular: Negative.    Gastrointestinal: Negative.    Endocrine: Negative.    Breasts:  negative.    Musculoskeletal: Positive for arthralgias and gait problem.   Skin: Negative.    Allergic/Immunologic: Negative.    Hematological: Negative.    Psychiatric/Behavioral: Negative.      Objective    /86 (BP Location: Left arm, Patient Position: Chair, Cuff Size: Adult Regular)   Pulse 75   Temp 97.1  F (36.2  C) (Tympanic)   Resp 18   Ht 1.575 m (5' 2\")   Wt 72.1 kg (159 lb)   LMP 09/28/2011   SpO2 98%   BMI 29.08 kg/m    Body mass index is 29.08 kg/m .  Physical Exam  Constitutional:       Appearance: Normal appearance.   HENT:      Head: Normocephalic and atraumatic.      Right Ear: Tympanic membrane normal.      Left Ear: Tympanic membrane normal.      Mouth/Throat:      Mouth: Mucous membranes are moist.   Eyes:      Extraocular Movements: Extraocular movements " intact.      Pupils: Pupils are equal, round, and reactive to light.   Cardiovascular:      Rate and Rhythm: Normal rate and regular rhythm.      Pulses: Normal pulses.      Heart sounds: Normal heart sounds.   Pulmonary:      Effort: Pulmonary effort is normal. No respiratory distress.      Breath sounds: Normal breath sounds. No stridor. No wheezing, rhonchi or rales.   Chest:      Chest wall: No tenderness.   Abdominal:      General: Abdomen is flat. Bowel sounds are normal.      Palpations: Abdomen is soft.   Musculoskeletal:         General: Swelling present.      Cervical back: Normal range of motion.      Right knee: Swelling present. Decreased range of motion. Tenderness present.   Skin:     General: Skin is warm and dry.   Neurological:      Mental Status: She is alert and oriented to person, place, and time.   Psychiatric:         Mood and Affect: Mood normal.         Behavior: Behavior normal.     IMPRESSION:  Trace knee joint effusion. No fractures are evident.  Minimal hypertrophic change in the medial and patellofemoral  compartments without joint space narrowing. Normal patellar alignment.

## 2023-05-19 PROBLEM — M06.4 INFLAMMATORY POLYARTHRITIS (H): Status: ACTIVE | Noted: 2023-05-19

## 2023-05-19 ASSESSMENT — ENCOUNTER SYMPTOMS
GASTROINTESTINAL NEGATIVE: 1
CONSTITUTIONAL NEGATIVE: 1
ARTHRALGIAS: 1
CARDIOVASCULAR NEGATIVE: 1
HEMATOLOGIC/LYMPHATIC NEGATIVE: 1
EYES NEGATIVE: 1
ALLERGIC/IMMUNOLOGIC NEGATIVE: 1
PSYCHIATRIC NEGATIVE: 1
RESPIRATORY NEGATIVE: 1
ENDOCRINE NEGATIVE: 1

## 2023-05-21 ENCOUNTER — HOSPITAL ENCOUNTER (OUTPATIENT)
Dept: MRI IMAGING | Facility: CLINIC | Age: 53
Discharge: HOME | End: 2023-05-21
Attending: FAMILY MEDICINE | Admitting: FAMILY MEDICINE
Payer: COMMERCIAL

## 2023-05-21 DIAGNOSIS — M25.561 ACUTE PAIN OF RIGHT KNEE: ICD-10-CM

## 2023-05-21 PROCEDURE — 73721 MRI JNT OF LWR EXTRE W/O DYE: CPT | Mod: RT

## 2023-05-24 DIAGNOSIS — G89.29 CHRONIC PAIN OF RIGHT KNEE: Primary | ICD-10-CM

## 2023-05-24 DIAGNOSIS — M25.561 CHRONIC PAIN OF RIGHT KNEE: Primary | ICD-10-CM

## 2023-05-24 NOTE — RESULT ENCOUNTER NOTE
Please inform patient that test result showed a small tear of the medial meniscus in her knee. Recommend follow up with orthopedics.  Thank you.     Ian Christiansen M.D.

## 2023-05-26 ENCOUNTER — TELEPHONE (OUTPATIENT)
Dept: FAMILY MEDICINE | Facility: CLINIC | Age: 53
End: 2023-05-26

## 2023-05-26 ENCOUNTER — OFFICE VISIT (OUTPATIENT)
Dept: ORTHOPEDICS | Facility: CLINIC | Age: 53
End: 2023-05-26
Payer: COMMERCIAL

## 2023-05-26 VITALS
HEART RATE: 80 BPM | SYSTOLIC BLOOD PRESSURE: 128 MMHG | BODY MASS INDEX: 29.08 KG/M2 | WEIGHT: 159 LBS | DIASTOLIC BLOOD PRESSURE: 86 MMHG

## 2023-05-26 DIAGNOSIS — G89.29 CHRONIC PAIN OF RIGHT KNEE: ICD-10-CM

## 2023-05-26 DIAGNOSIS — M25.561 CHRONIC PAIN OF RIGHT KNEE: ICD-10-CM

## 2023-05-26 DIAGNOSIS — M25.561 ACUTE PAIN OF RIGHT KNEE: Primary | ICD-10-CM

## 2023-05-26 DIAGNOSIS — M23.203 DEGENERATIVE TEAR OF MEDIAL MENISCUS OF RIGHT KNEE: ICD-10-CM

## 2023-05-26 DIAGNOSIS — Z98.890 S/P KNEE SURGERY: ICD-10-CM

## 2023-05-26 PROBLEM — S89.91XA INJURY OF RIGHT KNEE, INITIAL ENCOUNTER: Status: ACTIVE | Noted: 2023-05-26

## 2023-05-26 PROCEDURE — 99204 OFFICE O/P NEW MOD 45 MIN: CPT | Mod: 25 | Performed by: PEDIATRICS

## 2023-05-26 PROCEDURE — 20610 DRAIN/INJ JOINT/BURSA W/O US: CPT | Mod: RT | Performed by: PEDIATRICS

## 2023-05-26 RX ORDER — TRIAMCINOLONE ACETONIDE 40 MG/ML
40 INJECTION, SUSPENSION INTRA-ARTICULAR; INTRAMUSCULAR
Status: DISCONTINUED | OUTPATIENT
Start: 2023-05-26 | End: 2024-08-08

## 2023-05-26 RX ORDER — LIDOCAINE HYDROCHLORIDE 10 MG/ML
2 INJECTION, SOLUTION INFILTRATION; PERINEURAL
Status: DISCONTINUED | OUTPATIENT
Start: 2023-05-26 | End: 2024-08-08

## 2023-05-26 RX ADMIN — TRIAMCINOLONE ACETONIDE 40 MG: 40 INJECTION, SUSPENSION INTRA-ARTICULAR; INTRAMUSCULAR at 14:32

## 2023-05-26 RX ADMIN — LIDOCAINE HYDROCHLORIDE 2 ML: 10 INJECTION, SOLUTION INFILTRATION; PERINEURAL at 14:32

## 2023-05-26 NOTE — PROGRESS NOTES
ASSESSMENT & PLAN    Nessa was seen today for pain.    Diagnoses and all orders for this visit:    Acute pain of right knee  -     Physical Therapy Referral; Future    Degenerative tear of medial meniscus of right knee  -     Physical Therapy Referral; Future    S/P knee surgery  -     Physical Therapy Referral; Future      This issue is acute and Unchanged.    Patient Instructions   We discussed these other possible diagnosis: Right knee pain with evidence of mild arthritis, likely degenerative meniscal tear status postsurgery.    We discussed the following treatment options: symptom treatment, activity modification/rest, imaging, rehab and referral. Following discussion, plan: We will start with supportive care including injection and physical therapy referral, consider orthopedic surgery referral pending clinical course    Plan:  - Today's Plan of Care:  Steroid injection of the right knee was performed today in clinic  Icing for the next 1-2 days may be helpful for pain. Injection may take 10-14 days to see the full effect.    Work Letter Given    Rehab: Physical Therapy: [facility] - [phone]  -Let us know if you need this order faxed to an external physical therapy location    -We also discussed other future treatment options:  Referral to Orthopedic Surgery    Follow Up: 6 weeks    Concerning signs and symptoms were reviewed and all questions were answered at this time.    Thanks for the opportunity to participate in the care of this patient, I will keep you updated on their progress.    CC: Dr. Kuldip Erazo MD [facility]  Sports Medicine Physician  [facility]    -----  Chief Complaint   Patient presents with     Right Knee - Pain       SUBJECTIVE  Nessa Briceño is a/an 52 year old female who is seen in consultation at the request of MD Kuldip for evaluation of right knee pain.     The patient is seen by themselves.    Onset: 1 month(s) ago. Reports insidious onset  without acute precipitating event.  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  CV: distal perfusion intact  Resp: normal respiratory effort without conversational dyspnea   Psych: normal mood and affect  Gait: antalgic  Neuro: Normal light sensory exam of lower extremity    Bilateral Knee exam    Inspection:      mild swelling right, no effusion, well healed medial knee scar    Patella:      Mobility -       hypomobile right       Crepitus noted in the patellofemoral joint right    Tender:      medial patellar border right       medial joint line right    Non Tender:      remainder of knee area bilateral    Knee ROM:      Range of motion limited on the right in full flexion and extension    Hip ROM:     Full active and passive ROM bilateral    Strength:      5-/5 with knee extension right    Special Tests:     neg (-) Byron right       neg (-) anterior drawer right       neg (-) posterior drawer right       neg (-) varus at 0 deg and 30 deg right       neg (-) valgus at 0 deg and 30 deg right    Gait:      antalgic gait    Neurovascular:      2+ peripheral pulses bilaterally and brisk capillary refill       sensation grossly intact    RADIOLOGY:  Final results and radiologist's interpretation, available in the [facility]  record.  Images were reviewed with the patient in the office today.  My personal interpretation of the performed imaging:  MRI Right knee 5/21/2023 - medial meniscal tearing posterior horn with mild extrusion and cartilage thinning    XR Right Knee 5/18/2023 - mild medial and PF degenerative changes    Results for orders placed or performed during the hospital encounter of 05/21/23   MR Knee Right w/o Contrast    Narrative    EXAM: MR KNEE RIGHT W/O CONTRAST  LOCATION: [facility]  DATE/TIME: 5/21/2023 1:09 PM CDT    INDICATION: Right knee pain started about a month ago. There is swelling and patient says she hears a click when she walks.  COMPARISON: None.  TECHNIQUE: Unenhanced.    FINDINGS:    MEDIAL COMPARTMENT:   -Meniscus: Shallow radial tear of the posterior horn of the medial meniscus just peripheral to the root attachment. This does not extend the entire distance across the meniscus. There is also a horizontal cleavage tear adjacent to this region within the   posterior horn on series 8, image 21.  -Cartilage: Mild grade II cartilage thinning.    LATERAL COMPARTMENT:  -Meniscus: Normal.   -Cartilage: Normal.    PATELLOFEMORAL COMPARTMENT:   -Alignment: Patella midline. No subluxation or tilting.   -Cartilage: Normal.    CRUCIATE LIGAMENTS:   -ACL: Normal.  -PCL: Normal.    COLLATERAL LIGAMENTS:   -Medial collateral ligament: Superficial and deep fibers are normal.  -Lateral collateral ligament: Normal.    POSTEROMEDIAL CORNER:  -Distal semimembranosus tendon is normal.   -Pes anserine tendons are normal. Posteromedial corner complex ligaments are intact.    POSTEROLATERAL CORNER:   -Popliteal tendon is intact. No tendinopathy.  -Biceps femoris tendon and posterolateral corner complex ligaments are intact.    EXTENSOR MECHANISM:   -Quadriceps tendon: Mild tendinopathy.  -Patellar tendon: Normal.  -Patellofemoral ligaments and retinacula: Intact.    JOINT:   -Tiny effusion. No loose  body.    BONES:  -No fracture or concerning marrow replacing lesion.    SOFT TISSUES:   -Tiny popliteal cyst.       Impression    IMPRESSION:  1.  Tearing of the posterior horn of the medial meniscus includes a shallow radial tear just peripheral to the root attachment and a horizontal cleavage tear of the adjacent posterior horn.  2.  Mild grade II cartilage thinning both sides of the medial compartment.  3.  Mild quadriceps tendinopathy without tearing.  4.  Tiny effusion. No loose body.  5.  Mild prepatellar edema.  6.  No evidence for fracture.         Review of prior external note(s) from - PCP note  Review of the result(s) of each unique test - XR and MRI       Large Joint Injection/Arthocentesis: R knee joint    Date/Time: 5/26/2023 2:32 PM    Performed by: Cheyanne Erazo MD  Authorized by: Cheyanne Erazo MD    Indications:  Pain  Needle Size:  25 G  Guidance: landmark guided    Approach:  Anterolateral  Location:  Knee      Medications:  2 mL lidocaine 1 %; 40 mg triamcinolone 40 MG/ML  Outcome:  Tolerated well, no immediate complications  Procedure discussed: discussed risks, benefits, and alternatives    Consent Given by:  Patient  Timeout: timeout called immediately prior to procedure    Prep: patient was prepped and draped in usual sterile fashion     The risks, benefits and complications of steroid injection were discussed with the patient (including but not limited to: bleeding, infection, pain, scar, damage to adjacent structures, atrophy or necrosis of soft tissue, skin blanching, failure to relieve symptoms, worsening of symptoms, allergic reaction). After this discussion all questions were addressed and answered and the patient elected to proceed. The patient tolerated the procedure well without complications.  Also discussed that if diabetic, recommend close monitoring of blood sugars over the next week as cortisone injections can temporarily elevate blood sugars.

## 2023-05-26 NOTE — LETTER
5/26/2023         RE: Nessa Birceño  [address] [address]        Dear Colleague,    Thank you for referring your patient, Nessa Briceño, to the [facility] [place]. Please see a copy of my visit note below.    ASSESSMENT & PLAN    Nessa was seen today for pain.    Diagnoses and all orders for this visit:    Acute pain of right knee  -     Physical Therapy Referral; Future    Degenerative tear of medial meniscus of right knee  -     Physical Therapy Referral; Future    S/P knee surgery  -     Physical Therapy Referral; Future      This issue is acute and Unchanged.    Patient Instructions   We discussed these other possible diagnosis: Right knee pain with evidence of mild arthritis, likely degenerative meniscal tear status postsurgery.    We discussed the following treatment options: symptom treatment, activity modification/rest, imaging, rehab and referral. Following discussion, plan: We will start with supportive care including injection and physical therapy referral, consider orthopedic surgery referral pending clinical course    Plan:  - Today's Plan of Care:  Steroid injection of the right knee was performed today in clinic  Icing for the next 1-2 days may be helpful for pain. Injection may take 10-14 days to see the full effect.    Work Letter Given    Rehab: Physical Therapy: [facility] - [phone]  -Let us know if you need this order faxed to an external physical therapy location    -We also discussed other future treatment options:  Referral to Orthopedic Surgery    Follow Up: 6 weeks    Concerning signs and symptoms were reviewed and all questions were answered at this time.    Thanks for the opportunity to participate in the care of this patient, I will keep you updated on their progress.    CC: Dr. Kuldip Erazo MD [facility]  Sports Medicine Physician  [facility]    -----  Chief Complaint   Patient presents with      Right Knee - Pain       SUBJECTIVE  Nessa Briceño is a/an 52 year old female who is seen in consultation at the request of MD Kuldip for evaluation of right knee pain.     The patient is seen by themselves.    Onset: 1 month(s) ago. Reports insidious onset without acute precipitating event.  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  CV: distal perfusion intact  Resp: normal respiratory effort without conversational dyspnea   Psych: normal mood and affect  Gait: antalgic  Neuro: Normal light sensory exam of lower extremity    Bilateral Knee exam    Inspection:      mild swelling right, no effusion, well healed medial knee scar    Patella:      Mobility -       hypomobile right       Crepitus noted in the patellofemoral joint right    Tender:      medial patellar border right       medial joint line right    Non Tender:      remainder of knee area bilateral    Knee ROM:      Range of motion limited on the right in full flexion and extension    Hip ROM:     Full active and passive ROM bilateral    Strength:      5-/5 with knee extension right    Special Tests:     neg (-) Byron right       neg (-) anterior drawer right       neg (-) posterior drawer right       neg (-) varus at 0 deg and 30 deg right       neg (-) valgus at 0  deg and 30 deg right    Gait:      antalgic gait    Neurovascular:      2+ peripheral pulses bilaterally and brisk capillary refill       sensation grossly intact    RADIOLOGY:  Final results and radiologist's interpretation, available in the [facility] health record.  Images were reviewed with the patient in the office today.  My personal interpretation of the performed imaging:  MRI Right knee 5/21/2023 - medial meniscal tearing posterior horn with mild extrusion and cartilage thinning    XR Right Knee 5/18/2023 - mild medial and PF degenerative changes    Results for orders placed or performed during the hospital encounter of 05/21/23   MR Knee Right w/o Contrast    Narrative    EXAM: MR KNEE RIGHT W/O CONTRAST  LOCATION: [facility]  DATE/TIME: 5/21/2023 1:09 PM CDT    INDICATION: Right knee pain started about a month ago. There is swelling and patient says she hears a click when she walks.  COMPARISON: None.  TECHNIQUE: Unenhanced.    FINDINGS:    MEDIAL COMPARTMENT:   -Meniscus: Shallow radial tear of the posterior horn of the medial meniscus just peripheral to the root attachment. This does not extend the entire distance across the meniscus. There is also a horizontal cleavage tear adjacent to this region within the   posterior horn on series 8, image 21.  -Cartilage: Mild grade II cartilage thinning.    LATERAL COMPARTMENT:  -Meniscus: Normal.   -Cartilage: Normal.    PATELLOFEMORAL COMPARTMENT:   -Alignment: Patella midline. No subluxation or tilting.   -Cartilage: Normal.    CRUCIATE LIGAMENTS:   -ACL: Normal.  -PCL: Normal.    COLLATERAL LIGAMENTS:   -Medial collateral ligament: Superficial and deep fibers are normal.  -Lateral collateral ligament: Normal.    POSTEROMEDIAL CORNER:  -Distal semimembranosus tendon is normal.   -Pes anserine tendons are normal. Posteromedial corner complex ligaments are intact.    POSTEROLATERAL CORNER:   -Popliteal tendon is intact. No  tendinopathy.  -Biceps femoris tendon and posterolateral corner complex ligaments are intact.    EXTENSOR MECHANISM:   -Quadriceps tendon: Mild tendinopathy.  -Patellar tendon: Normal.  -Patellofemoral ligaments and retinacula: Intact.    JOINT:   -Tiny effusion. No loose body.    BONES:  -No fracture or concerning marrow replacing lesion.    SOFT TISSUES:   -Tiny popliteal cyst.       Impression    IMPRESSION:  1.  Tearing of the posterior horn of the medial meniscus includes a shallow radial tear just peripheral to the root attachment and a horizontal cleavage tear of the adjacent posterior horn.  2.  Mild grade II cartilage thinning both sides of the medial compartment.  3.  Mild quadriceps tendinopathy without tearing.  4.  Tiny effusion. No loose body.  5.  Mild prepatellar edema.  6.  No evidence for fracture.         Review of prior external note(s) from - PCP note  Review of the result(s) of each unique test - XR and MRI       Large Joint Injection/Arthocentesis: R knee joint    Date/Time: 5/26/2023 2:32 PM    Performed by: Cheyanne Erazo MD  Authorized by: Cheyanne Erazo MD    Indications:  Pain  Needle Size:  25 G  Guidance: landmark guided    Approach:  Anterolateral  Location:  Knee      Medications:  2 mL lidocaine 1 %; 40 mg triamcinolone 40 MG/ML  Outcome:  Tolerated well, no immediate complications  Procedure discussed: discussed risks, benefits, and alternatives    Consent Given by:  Patient  Timeout: timeout called immediately prior to procedure    Prep: patient was prepped and draped in usual sterile fashion     The risks, benefits and complications of steroid injection were discussed with the patient (including but not limited to: bleeding, infection, pain, scar, damage to adjacent structures, atrophy or necrosis of soft tissue, skin blanching, failure to relieve symptoms, worsening of symptoms, allergic reaction). After this discussion all questions were addressed and answered and the  patient elected to proceed. The patient tolerated the procedure well without complications.  Also discussed that if diabetic, recommend close monitoring of blood sugars over the next week as cortisone injections can temporarily elevate blood sugars.            Again, thank you for allowing me to participate in the care of your patient.        Sincerely,        Cheyanne Erazo MD

## 2023-05-26 NOTE — TELEPHONE ENCOUNTER
Order/Referral Request    Who is requesting: Pt    Orders being requested: Physical Therapy    Reason service is needed/diagnosis: Right knee issue    When are orders needed by: as soon as possible    Does patient have a preference on a Group/Provider/Facility? [facility], phone: [phone]    Where to send orders: Fax: [phone]    Could we send this information to you in [employer] or would you prefer to receive a phone call?:   Patient would prefer a phone call   Okay to leave a detailed message?: Yes at Cell number on file:    Telephone Information:   Mobile [phone]

## 2023-05-26 NOTE — TELEPHONE ENCOUNTER
Detailed message left on pt identified VM telling her referral was faxed to her requested PT office in [place] at [phone]. Referral was put in by oumou JIMENEZ yesterday. Sisi Pineda RN

## 2023-05-26 NOTE — PATIENT INSTRUCTIONS
We discussed these other possible diagnosis: Right knee pain with evidence of mild arthritis, likely degenerative meniscal tear status postsurgery.    We discussed the following treatment options: symptom treatment, activity modification/rest, imaging, rehab and referral. Following discussion, plan: We will start with supportive care including injection and physical therapy referral, consider orthopedic surgery referral pending clinical course    Plan:  - Today's Plan of Care:  Steroid injection of the right knee was performed today in clinic  Icing for the next 1-2 days may be helpful for pain. Injection may take 10-14 days to see the full effect.    Work Letter Given    Rehab: Physical Therapy: [facility] - [phone]  -Let us know if you need this order faxed to an external physical therapy location    -We also discussed other future treatment options:  Referral to Orthopedic Surgery    Follow Up: 6 weeks    If you have any further questions for your physician or physician s care team you can call [phone] and use option 3 to leave a voice message.     After the Injection     After the injection, strenuous and repetitive activity should be minimized for approximately 48 hours.   Ice should be applied to the injected area at least for the next 48 hours.   Apply ice to the injected area at least 3 - 4 times a day for 20 minutes each time for the next 48 hours. This can reduce the painful  flare  reaction that can follow an injection the next day. This reaction can cause the area that was injected to hurt more the next day just from the injection. This will resolve within a day if it does occur.     Use over-the-counter pain medications such as Tylenol to help with the pain if necessary.     After 48 hours, icing the area may be continued if you find it beneficial.     The lidocaine or marcaine (commonly called Novocain) is an anesthetic agent that is injected with the steroid will typically relieve  your pain for a few hours following the injection. If the  Novocain  and steroid are injected near a nerve, you may experience local numbness or weakness from the nerve block until it wears off. After this wears off your pain may return until the steroid takes effect.   The steroid may be effective immediately after the injection. Do not be concerned if the injection is not effective in relieving your symptoms immediately. In some cases, it may take up to two weeks for the steroid to work.   If you are diabetic, the corticosteroid may cause your blood sugar to become elevated for several days following the injection. This response usually lasts about 2-4 days before it returns to your normal level.   You should report any adverse reaction to you doctor. Call if there are any questions.

## 2023-05-26 NOTE — LETTER
May 26, 2023      Nessa GEORGETTE Briceño  [address] [place] [id number]        To Whom It May Concern,     Nessa was seen for right knee pain.  She can work with the following restrictions:    - Seated work only, limit walking  - No twisting, bending      Off work if unable to work under these restrictions.      Follow up ~ 6 weeks    Sincerely,        Cheyanne Erazo MD

## 2023-06-02 ENCOUNTER — TRANSFERRED RECORDS (OUTPATIENT)
Dept: HEALTH INFORMATION MANAGEMENT | Facility: CLINIC | Age: 53
End: 2023-06-02
Payer: COMMERCIAL

## 2023-06-15 ENCOUNTER — OFFICE VISIT (OUTPATIENT)
Dept: FAMILY MEDICINE | Facility: CLINIC | Age: 53
End: 2023-06-15
Payer: COMMERCIAL

## 2023-06-15 VITALS
HEIGHT: 62 IN | WEIGHT: 160 LBS | BODY MASS INDEX: 29.44 KG/M2 | RESPIRATION RATE: 24 BRPM | TEMPERATURE: 97.8 F | DIASTOLIC BLOOD PRESSURE: 85 MMHG | HEART RATE: 82 BPM | OXYGEN SATURATION: 97 % | SYSTOLIC BLOOD PRESSURE: 121 MMHG

## 2023-06-15 DIAGNOSIS — Z86.39 HISTORY OF THYROIDITIS: ICD-10-CM

## 2023-06-15 DIAGNOSIS — M23.306 DEGENERATIVE TEAR OF MENISCUS, RIGHT: ICD-10-CM

## 2023-06-15 DIAGNOSIS — M06.4 INFLAMMATORY POLYARTHRITIS (H): Primary | ICD-10-CM

## 2023-06-15 PROCEDURE — 99213 OFFICE O/P EST LOW 20 MIN: CPT | Performed by: FAMILY MEDICINE

## 2023-06-15 ASSESSMENT — PAIN SCALES - GENERAL: PAINLEVEL: MODERATE PAIN (4)

## 2023-06-15 NOTE — PROGRESS NOTES
"  Assessment & Plan     Inflammatory polyarthritis (H)  Known to have inflammatory polyarthritis, using Plaquenil and following rheumatology.  Workability form completed and suggested to schedule rheumatology follow-up visit as well    History of thyroiditis  Last blood work-up consistent with euthyroid state.  Suggested to schedule routine physical exam, will repeat labs as well    Degenerative tear of meniscus, right  Had right knee meniscal tear in April this year, getting physical therapy and following orthopedic, has an appointment with sports medicine next month        Dinesh Blake MD  [facility]    David Szymanski is a 53 year old, presenting for the following health issues:  Forms and Knee Pain    Roomed by: Lynnette             History of Present Illness       Reason for visit:  [employer] paperwork, knee injury  Symptom onset:  More than a month  Symptoms include:  Pain  Symptom intensity:  Moderate  Symptom progression:  Improving  Had these symptoms before:  No  What makes it worse:  Walking, bending, twistung knee  What makes it better:  Rest, ice and heat    She eats 2-3 servings of fruits and vegetables daily.She consumes 1 sweetened beverage(s) daily.She exercises with enough effort to increase her heart rate 10 to 19 minutes per day.  She exercises with enough effort to increase her heart rate 3 or less days per week. She is missing 2 dose(s) of medications per week.  She is not taking prescribed medications regularly due to remembering to take.       Review of Systems   Constitutional, HEENT, cardiovascular, pulmonary, gi and gu systems are negative, except as otherwise noted.      Objective    /85   Pulse 82   Temp 97.8  F (36.6  C)   Resp 24   Ht 1.575 m (5' 2\")   Wt 72.6 kg (160 lb)   LMP 09/28/2011   SpO2 97%   Breastfeeding No   BMI 29.26 kg/m    Body mass index is 29.26 kg/m .  Physical Exam   GENERAL: alert and no distress  EYES: Eyes grossly normal to " inspection, PERRL and conjunctivae and sclerae normal  HENT: normal cephalic/atraumatic, nose and mouth without ulcers or lesions, oropharynx clear and oral mucous membranes moist  NECK: no adenopathy, no asymmetry, masses, or scars and thyroid normal to palpation  RESP: lungs clear to auscultation - no rales, rhonchi or wheezes  CV: regular rates and rhythm, normal S1 S2, no S3 or S4 and no murmur, click or rub  ABDOMEN: soft, nontender  MS: Right knee painful, antalgic gait, no significant swelling noted  NEURO: Normal strength and tone, mentation intact and speech normal  PSYCH: mentation appears normal, affect normal/bright

## 2023-06-19 ENCOUNTER — TELEPHONE (OUTPATIENT)
Dept: FAMILY MEDICINE | Facility: CLINIC | Age: 53
End: 2023-06-19
Payer: COMMERCIAL

## 2023-06-19 ENCOUNTER — TELEPHONE (OUTPATIENT)
Dept: ORTHOPEDICS | Facility: CLINIC | Age: 53
End: 2023-06-19

## 2023-06-19 NOTE — TELEPHONE ENCOUNTER
Forms/Letter Request    Type of form/letter: [employer] paper work -[employer]    Original given to pt at appt    Form received back signed  6/19/23  Faxed Back & Sent to be scanned to this encounter  [facility]

## 2023-06-19 NOTE — TELEPHONE ENCOUNTER
Left a message that we have received request of health information from [place]. We need a release to be signed prior to filling out and returning form to [place].    Tammy Ca, [facility]    Karrie Triage supporting Dr. Cheyanne Erazo

## 2023-06-26 ENCOUNTER — TELEPHONE (OUTPATIENT)
Dept: ORTHOPEDICS | Facility: CLINIC | Age: 53
End: 2023-06-26
Payer: COMMERCIAL

## 2023-06-26 NOTE — TELEPHONE ENCOUNTER
Attending physicians form for [employer] ready for review and signature.      Kristin Acharya ATC, CSCS  Dr. Erazo's Clinical Coordinator  [facility] Sports Medicine Team

## 2023-06-26 NOTE — TELEPHONE ENCOUNTER
Patient does not have consent form signed.  Forms cannot be completed at this time.     Kristin Acharya ATC, CSCS  Dr. Erazo's Clinical Coordinator  [facility] Sports Medicine Team

## 2023-07-05 ENCOUNTER — OFFICE VISIT (OUTPATIENT)
Dept: ORTHOPEDICS | Facility: CLINIC | Age: 53
End: 2023-07-05
Payer: COMMERCIAL

## 2023-07-05 VITALS
SYSTOLIC BLOOD PRESSURE: 133 MMHG | DIASTOLIC BLOOD PRESSURE: 93 MMHG | HEART RATE: 99 BPM | BODY MASS INDEX: 29.26 KG/M2 | WEIGHT: 160 LBS

## 2023-07-05 DIAGNOSIS — S83.241D TEAR OF MEDIAL MENISCUS OF RIGHT KNEE, UNSPECIFIED TEAR TYPE, UNSPECIFIED WHETHER OLD OR CURRENT TEAR, SUBSEQUENT ENCOUNTER: ICD-10-CM

## 2023-07-05 DIAGNOSIS — M25.561 ACUTE PAIN OF RIGHT KNEE: Primary | ICD-10-CM

## 2023-07-05 DIAGNOSIS — Z98.890 S/P KNEE SURGERY: ICD-10-CM

## 2023-07-05 PROCEDURE — 99213 OFFICE O/P EST LOW 20 MIN: CPT | Performed by: PEDIATRICS

## 2023-07-05 NOTE — PROGRESS NOTES
ASSESSMENT & PLAN    Nessa was seen today for pain and follow up.    Diagnoses and all orders for this visit:    Acute pain of right knee  -     Orthopedic  Referral; Future    S/P knee surgery  -     Orthopedic  Referral; Future    Tear of medial meniscus of right knee, unspecified tear type, unspecified whether old or current tear, subsequent encounter  -     Orthopedic  Referral; Future      This issue is acute and Unchanged.    Patient Instructions   We discussed these other possible diagnosis: Given lack of improvement with prior PT and injections will refer to orthopedic surgery.    Plan:  - Today's Plan of Care:  Referral to an Orthopedic Surgeon - [facility] Dr. Delgado  Continue PT  Discussed activity considerations and other supportive care including Ice/Heat, OTC and other topical medications as needed.    Follow Up: as needed    Concerning signs and symptoms were reviewed and all questions were answered at this time.    Cheyanne Erazo MD [facility]  Sports Medicine Physician  [facility]      SUBJECTIVE- Interim History July 5, 2023    Chief Complaint   Patient presents with     Left Knee - Pain, Follow Up       Nessa Briceño is a 53 year old female who is seen in f/u up for    Acute pain of right knee  S/P knee surgery  Tear of medial meniscus of right knee, unspecified tear type, unspecified whether old or current tear, subsequent encounter. Since last visit on 5/26/23 patient has been doing okay. She is SLOWLY getting better. She states she is improving and healing, but slow.  Physical Therapy is going well, corticosteroid injection (5/26/23) only helped for 2 days  - Now ~ 2.5 months from initial onset    Worsened by: walking, rotating, flexion, extension   Better with: nothing   Treatments tried: rest/activity avoidance, elevation, ice, Tylenol, ibuprofen, previous imaging (MRI 5/21/23 and xray 5/18/23) and casting/splinting/bracing, physical therapy (fysical in  Problem: Potential for Falls  Goal: Patient will remain free of falls  Description: INTERVENTIONS:  - Educate patient/family on patient safety including physical limitations  - Instruct patient to call for assistance with activity   - Consult OT/PT to assist with strengthening/mobility   - Keep Call bell within reach  - Keep bed low and locked with side rails adjusted as appropriate  - Keep care items and personal belongings within reach  - Initiate and maintain comfort rounds  - Make Fall Risk Sign visible to staff  - Offer Toileting every 2 Hours, in advance of need  - Initiate/Maintain bed alarm  - Obtain necessary fall risk management equipment  - Apply yellow socks and bracelet for high fall risk patients  - Consider moving patient to room near nurses station  Outcome: Progressing     Problem: Nutrition/Hydration-ADULT  Goal: Nutrient/Hydration intake appropriate for improving, restoring or maintaining nutritional needs  Description: Monitor and assess patient's nutrition/hydration status for malnutrition  Collaborate with interdisciplinary team and initiate plan and interventions as ordered  Monitor patient's weight and dietary intake as ordered or per policy  Utilize nutrition screening tool and intervene as necessary  Determine patient's food preferences and provide high-protein, high-caloric foods as appropriate       INTERVENTIONS:  - Monitor oral intake, urinary output, labs, and treatment plans  - Assess nutrition and hydration status and recommend course of action  - Evaluate amount of meals eaten  - Assist patient with eating if necessary   - Allow adequate time for meals  - Recommend/ encourage appropriate diets, oral nutritional supplements, and vitamin/mineral supplements  - Order, calculate, and assess calorie counts as needed  - Recommend, monitor, and adjust tube feedings and TPN/PPN based on assessed needs  - Assess need for intravenous fluids  - Provide specific nutrition/hydration education [place] 2x weekly ), Corticosteroid injection 5/26/23 that provided 2 days of relief.    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  CV: distal perfusion intact  Resp: normal respiratory effort without conversational dyspnea   Psych: normal mood and affect  Gait: slightly antalgic  Neuro: Normal light sensory exam of lower extremity     Bilateral Knee exam  Inspection:      mild swelling right, no effusion, well healed medial knee scar     Patella:      Mobility -       hypomobile right       Crepitus noted in the patellofemoral joint right     Tender:      medial patellar border right       medial joint line right     Non Tender:      remainder of knee area bilateral     Knee ROM:      Range of motion limited on the right in full flexion and extension     Hip ROM:     Full active and passive ROM bilateral     Strength:      5-/5 with knee extension right     Special Tests:     positive (+) Byron right       neg (-) anterior drawer right       neg (-) posterior drawer right       neg (-) varus at 0 deg and 30 deg right       neg (-) valgus at 0 deg and 30 deg right     Gait:      antalgic gait     Neurovascular:      2+ peripheral pulses bilaterally and brisk capillary refill       sensation grossly intact    RADIOLOGY:  Final results and radiologist's interpretation, available in the [facility] health record.  Images were reviewed with the patient in the office today.  My personal interpretation of the performed imaging:   MRI Right knee 5/21/2023 - medial meniscal tearing posterior horn with mild  as appropriate  - Include patient/family/caregiver in decisions related to nutrition  Outcome: Progressing     Problem: SAFETY ADULT  Goal: Patient will remain free of falls  Description: INTERVENTIONS:  - Educate patient/family on patient safety including physical limitations  - Instruct patient to call for assistance with activity   - Consult OT/PT to assist with strengthening/mobility   - Keep Call bell within reach  - Keep bed low and locked with side rails adjusted as appropriate  - Keep care items and personal belongings within reach  - Initiate and maintain comfort rounds  - Make Fall Risk Sign visible to staff  - Offer Toileting every 2 Hours, in advance of need  - Initiate/Maintain bed alarm  - Obtain necessary fall risk management equipment  - Apply yellow socks and bracelet for high fall risk patients  - Consider moving patient to room near nurses station  Outcome: Progressing  Goal: Maintain or return to baseline ADL function  Description: INTERVENTIONS:  -  Assess patient's ability to carry out ADLs; assess patient's baseline for ADL function and identify physical deficits which impact ability to perform ADLs (bathing, care of mouth/teeth, toileting, grooming, dressing, etc )  - Assess/evaluate cause of self-care deficits   - Assess range of motion  - Assess patient's mobility; develop plan if impaired  - Assess patient's need for assistive devices and provide as appropriate  - Encourage maximum independence but intervene and supervise when necessary  - Involve family in performance of ADLs  - Assess for home care needs following discharge   - Consider OT consult to assist with ADL evaluation and planning for discharge  - Provide patient education as appropriate  Outcome: Progressing  Goal: Maintains/Returns to pre admission functional level  Description: INTERVENTIONS:  - Perform BMAT or MOVE assessment daily    - Set and communicate daily mobility goal to care team and patient/family/caregiver     - Collaborate with rehabilitation services on mobility goals if consulted  - Perform Range of Motion 3 times a day  - Reposition patient every 2 hours  - Dangle patient 3 times a day  - Stand patient 3 times a day  - Ambulate patient 3 times a day  - Out of bed to chair 3 times a day   - Out of bed for meals 3 times a day  - Out of bed for toileting  - Record patient progress and toleration of activity level   Outcome: Progressing     Problem: DISCHARGE PLANNING  Goal: Discharge to home or other facility with appropriate resources  Description: INTERVENTIONS:  - Identify barriers to discharge w/patient and caregiver  - Arrange for needed discharge resources and transportation as appropriate  - Identify discharge learning needs (meds, wound care, etc )  - Arrange for interpretive services to assist at discharge as needed  - Refer to Case Management Department for coordinating discharge planning if the patient needs post-hospital services based on physician/advanced practitioner order or complex needs related to functional status, cognitive ability, or social support system  Outcome: Progressing     Problem: Knowledge Deficit  Goal: Patient/family/caregiver demonstrates understanding of disease process, treatment plan, medications, and discharge instructions  Description: Complete learning assessment and assess knowledge base    Interventions:  - Provide teaching at level of understanding  - Provide teaching via preferred learning methods  Outcome: Progressing     Problem: CARDIOVASCULAR - ADULT  Goal: Maintains optimal cardiac output and hemodynamic stability  Description: INTERVENTIONS:  - Monitor I/O, vital signs and rhythm  - Monitor for S/S and trends of decreased cardiac output  - Administer and titrate ordered vasoactive medications to optimize hemodynamic stability  - Assess quality of pulses, skin color and temperature  - Assess for signs of decreased coronary artery perfusion  - Instruct patient to report extrusion and cartilage thinning    Results for orders placed or performed during the hospital encounter of 05/21/23   MR Knee Right w/o Contrast    Narrative    EXAM: MR KNEE RIGHT W/O CONTRAST  LOCATION: [facility]  DATE/TIME: 5/21/2023 1:09 PM CDT    INDICATION: Right knee pain started about a month ago. There is swelling and patient says she hears a click when she walks.  COMPARISON: None.  TECHNIQUE: Unenhanced.    FINDINGS:    MEDIAL COMPARTMENT:   -Meniscus: Shallow radial tear of the posterior horn of the medial meniscus just peripheral to the root attachment. This does not extend the entire distance across the meniscus. There is also a horizontal cleavage tear adjacent to this region within the   posterior horn on series 8, image 21.  -Cartilage: Mild grade II cartilage thinning.    LATERAL COMPARTMENT:  -Meniscus: Normal.   -Cartilage: Normal.    PATELLOFEMORAL COMPARTMENT:   -Alignment: Patella midline. No subluxation or tilting.   -Cartilage: Normal.    CRUCIATE LIGAMENTS:   -ACL: Normal.  -PCL: Normal.    COLLATERAL LIGAMENTS:   -Medial collateral ligament: Superficial and deep fibers are normal.  -Lateral collateral ligament: Normal.    POSTEROMEDIAL CORNER:  -Distal semimembranosus tendon is normal.   -Pes anserine tendons are normal. Posteromedial corner complex ligaments are intact.    POSTEROLATERAL CORNER:   -Popliteal tendon is intact. No tendinopathy.  -Biceps femoris tendon and posterolateral corner complex ligaments are intact.    EXTENSOR MECHANISM:   -Quadriceps tendon: Mild tendinopathy.  -Patellar tendon: Normal.  -Patellofemoral ligaments and retinacula: Intact.    JOINT:   -Tiny effusion. No loose body.    BONES:  -No fracture or concerning marrow replacing lesion.    SOFT TISSUES:   -Tiny popliteal cyst.       Impression    IMPRESSION:  1.  Tearing of the posterior horn of the medial meniscus includes a shallow radial tear just peripheral to the root  attachment and a horizontal cleavage tear of the adjacent posterior horn.  2.  Mild grade II cartilage thinning both sides of the medial compartment.  3.  Mild quadriceps tendinopathy without tearing.  4.  Tiny effusion. No loose body.  5.  Mild prepatellar edema.  6.  No evidence for fracture.       Review of the result(s) of each unique test - MRI          change in severity of symptoms  Outcome: Progressing  Goal: Absence of cardiac dysrhythmias or at baseline rhythm  Description: INTERVENTIONS:  - Continuous cardiac monitoring, vital signs, obtain 12 lead EKG if ordered  - Administer antiarrhythmic and heart rate control medications as ordered  - Monitor electrolytes and administer replacement therapy as ordered  Outcome: Progressing     Problem: RESPIRATORY - ADULT  Goal: Achieves optimal ventilation and oxygenation  Description: INTERVENTIONS:  - Assess for changes in respiratory status  - Assess for changes in mentation and behavior  - Position to facilitate oxygenation and minimize respiratory effort  - Oxygen administered by appropriate delivery if ordered  - Initiate smoking cessation education as indicated  - Encourage broncho-pulmonary hygiene including cough, deep breathe, Incentive Spirometry  - Assess the need for suctioning and aspirate as needed  - Assess and instruct to report SOB or any respiratory difficulty  - Respiratory Therapy support as indicated  Outcome: Progressing     Problem: METABOLIC, FLUID AND ELECTROLYTES - ADULT  Goal: Electrolytes maintained within normal limits  Description: INTERVENTIONS:  - Monitor labs and assess patient for signs and symptoms of electrolyte imbalances  - Administer electrolyte replacement as ordered  - Monitor response to electrolyte replacements, including repeat lab results as appropriate  - Instruct patient on fluid and nutrition as appropriate  Outcome: Progressing  Goal: Fluid balance maintained  Description: INTERVENTIONS:  - Monitor labs   - Monitor I/O and WT  - Instruct patient on fluid and nutrition as appropriate  - Assess for signs & symptoms of volume excess or deficit  Outcome: Progressing     Problem: SKIN/TISSUE INTEGRITY - ADULT  Goal: Incision(s), wounds(s) or drain site(s) healing without S/S of infection  Description: INTERVENTIONS  - Assess and document dressing, incision, wound bed, drain sites and surrounding tissue  - Provide patient and family education  - Perform skin care/dressing changes every     Outcome: Progressing

## 2023-07-05 NOTE — LETTER
July 5, 2023      Nessa PALOMINO Keyanna  [address] [place] [id number]        To Whom It May Concern,      Nessa was seen for right knee pain.  She can work with the following restrictions:     - Seated work only, limit walking  - No twisting, bending        Off work if unable to work under these restrictions.     Follow up will be with Orthopedic Surgery.     Sincerely,           Cheyanne Erazo MD

## 2023-07-05 NOTE — LETTER
7/5/2023         RE: Nessa Briceño  [address] [facility] [address]        Dear Colleague,    Thank you for referring your patient, Nessa Briceño, to the [facility] [place]. Please see a copy of my visit note below.    ASSESSMENT & PLAN    Nessa was seen today for pain and follow up.    Diagnoses and all orders for this visit:    Acute pain of right knee  -     Orthopedic  Referral; Future    S/P knee surgery  -     Orthopedic  Referral; Future    Tear of medial meniscus of right knee, unspecified tear type, unspecified whether old or current tear, subsequent encounter  -     Orthopedic  Referral; Future      This issue is acute and Unchanged.    Patient Instructions   We discussed these other possible diagnosis: Given lack of improvement with prior PT and injections will refer to orthopedic surgery.    Plan:  - Today's Plan of Care:  Referral to an Orthopedic Surgeon - [facility] Dr. Delgado  Continue PT  Discussed activity considerations and other supportive care including Ice/Heat, OTC and other topical medications as needed.    Follow Up: as needed    Concerning signs and symptoms were reviewed and all questions were answered at this time.    Cheyanne Erazo MD [facility]  Sports Medicine Physician  [facility]      SUBJECTIVE- Interim History July 5, 2023    Chief Complaint   Patient presents with     Left Knee - Pain, Follow Up       Nessa Briceño is a 53 year old female who is seen in f/u up for    Acute pain of right knee  S/P knee surgery  Tear of medial meniscus of right knee, unspecified tear type, unspecified whether old or current tear, subsequent encounter. Since last visit on 5/26/23 patient has been doing okay. She is SLOWLY getting better. She states she is improving and healing, but slow.  Physical Therapy is going well, corticosteroid injection (5/26/23) only helped for 2 days  - Now ~ 2.5 months from initial  onset    Worsened by: walking, rotating, flexion, extension   Better with: nothing   Treatments tried: rest/activity avoidance, elevation, ice, Tylenol, ibuprofen, previous imaging (MRI 5/21/23 and xray 5/18/23) and casting/splinting/bracing, physical therapy (f[place]ical [place] 2x weekly ), Corticosteroid injection 5/26/23 that provided 2 days of relief.    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  CV: distal perfusion intact  Resp: normal respiratory effort without conversational dyspnea   Psych: normal mood and affect  Gait: slightly antalgic  Neuro: Normal light sensory exam of lower extremity     Bilateral Knee exam  Inspection:      mild swelling right, no effusion, well healed medial knee scar     Patella:      Mobility -       hypomobile right       Crepitus noted in the patellofemoral joint right     Tender:      medial patellar border right       medial joint line right     Non Tender:      remainder of knee area bilateral     Knee ROM:      Range of motion limited on the right in full flexion and extension     Hip ROM:     Full active and passive ROM bilateral     Strength:      5-/5 with knee extension right     Special Tests:     positive (+) Byron right       neg (-) anterior drawer right       neg (-) posterior drawer right       neg (-) varus at 0 deg and 30 deg right       neg (-) valgus at 0 deg and 30 deg right     Gait:      antalgic gait     Neurovascular:      2+ peripheral pulses bilaterally and brisk capillary refill       sensation grossly  intact    RADIOLOGY:  Final results and radiologist's interpretation, available in the [facility] health record.  Images were reviewed with the patient in the office today.  My personal interpretation of the performed imaging:   MRI Right knee 5/21/2023 - medial meniscal tearing posterior horn with mild extrusion and cartilage thinning    Results for orders placed or performed during the hospital encounter of 05/21/23   MR Knee Right w/o Contrast    Narrative    EXAM: MR KNEE RIGHT W/O CONTRAST  LOCATION: [facility]  DATE/TIME: 5/21/2023 1:09 PM CDT    INDICATION: Right knee pain started about a month ago. There is swelling and patient says she hears a click when she walks.  COMPARISON: None.  TECHNIQUE: Unenhanced.    FINDINGS:    MEDIAL COMPARTMENT:   -Meniscus: Shallow radial tear of the posterior horn of the medial meniscus just peripheral to the root attachment. This does not extend the entire distance across the meniscus. There is also a horizontal cleavage tear adjacent to this region within the   posterior horn on series 8, image 21.  -Cartilage: Mild grade II cartilage thinning.    LATERAL COMPARTMENT:  -Meniscus: Normal.   -Cartilage: Normal.    PATELLOFEMORAL COMPARTMENT:   -Alignment: Patella midline. No subluxation or tilting.   -Cartilage: Normal.    CRUCIATE LIGAMENTS:   -ACL: Normal.  -PCL: Normal.    COLLATERAL LIGAMENTS:   -Medial collateral ligament: Superficial and deep fibers are normal.  -Lateral collateral ligament: Normal.    POSTEROMEDIAL CORNER:  -Distal semimembranosus tendon is normal.   -Pes anserine tendons are normal. Posteromedial corner complex ligaments are intact.    POSTEROLATERAL CORNER:   -Popliteal tendon is intact. No tendinopathy.  -Biceps femoris tendon and posterolateral corner complex ligaments are intact.    EXTENSOR MECHANISM:   -Quadriceps tendon: Mild tendinopathy.  -Patellar tendon: Normal.  -Patellofemoral ligaments and retinacula:  Intact.    JOINT:   -Tiny effusion. No loose body.    BONES:  -No fracture or concerning marrow replacing lesion.    SOFT TISSUES:   -Tiny popliteal cyst.       Impression    IMPRESSION:  1.  Tearing of the posterior horn of the medial meniscus includes a shallow radial tear just peripheral to the root attachment and a horizontal cleavage tear of the adjacent posterior horn.  2.  Mild grade II cartilage thinning both sides of the medial compartment.  3.  Mild quadriceps tendinopathy without tearing.  4.  Tiny effusion. No loose body.  5.  Mild prepatellar edema.  6.  No evidence for fracture.       Review of the result(s) of each unique test - MRI             Again, thank you for allowing me to participate in the care of your patient.        Sincerely,        Cheyanne Erazo MD

## 2023-07-05 NOTE — PATIENT INSTRUCTIONS
We discussed these other possible diagnosis: Given lack of improvement with prior PT and injections will refer to orthopedic surgery.    Plan:  - Today's Plan of Care:  Referral to an Orthopedic Surgeon - [facility] Dr. Delgado  Continue PT  Discussed activity considerations and other supportive care including Ice/Heat, OTC and other topical medications as needed.    Follow Up: as needed    If you have any further questions for your physician or physician s care team you can call [phone] and use option 3 to leave a voice message.

## 2023-09-13 ENCOUNTER — OFFICE VISIT (OUTPATIENT)
Dept: FAMILY MEDICINE | Facility: CLINIC | Age: 53
End: 2023-09-13
Payer: COMMERCIAL

## 2023-09-13 VITALS
DIASTOLIC BLOOD PRESSURE: 84 MMHG | SYSTOLIC BLOOD PRESSURE: 130 MMHG | OXYGEN SATURATION: 98 % | WEIGHT: 157.2 LBS | TEMPERATURE: 98.1 F | RESPIRATION RATE: 22 BRPM | BODY MASS INDEX: 28.93 KG/M2 | HEIGHT: 62 IN | HEART RATE: 102 BPM

## 2023-09-13 DIAGNOSIS — R60.0 PEDAL EDEMA: ICD-10-CM

## 2023-09-13 DIAGNOSIS — E78.2 MIXED HYPERLIPIDEMIA: ICD-10-CM

## 2023-09-13 DIAGNOSIS — Z00.00 ROUTINE HISTORY AND PHYSICAL EXAMINATION OF ADULT: Primary | ICD-10-CM

## 2023-09-13 DIAGNOSIS — R79.89 LOW TSH LEVEL: ICD-10-CM

## 2023-09-13 DIAGNOSIS — M06.4 INFLAMMATORY POLYARTHRITIS (H): ICD-10-CM

## 2023-09-13 LAB
CHOLEST SERPL-MCNC: 259 MG/DL
ERYTHROCYTE [SEDIMENTATION RATE] IN BLOOD BY WESTERGREN METHOD: 26 MM/HR (ref 0–30)
HDLC SERPL-MCNC: 73 MG/DL
LDLC SERPL CALC-MCNC: 166 MG/DL
NONHDLC SERPL-MCNC: 186 MG/DL
TRIGL SERPL-MCNC: 99 MG/DL
TSH SERPL DL<=0.005 MIU/L-ACNC: 1.34 UIU/ML (ref 0.3–4.2)

## 2023-09-13 PROCEDURE — 85652 RBC SED RATE AUTOMATED: CPT | Performed by: FAMILY MEDICINE

## 2023-09-13 PROCEDURE — 84443 ASSAY THYROID STIM HORMONE: CPT | Performed by: FAMILY MEDICINE

## 2023-09-13 PROCEDURE — 80061 LIPID PANEL: CPT | Performed by: FAMILY MEDICINE

## 2023-09-13 PROCEDURE — 36415 COLL VENOUS BLD VENIPUNCTURE: CPT | Performed by: FAMILY MEDICINE

## 2023-09-13 PROCEDURE — 99396 PREV VISIT EST AGE 40-64: CPT | Performed by: FAMILY MEDICINE

## 2023-09-13 PROCEDURE — 99214 OFFICE O/P EST MOD 30 MIN: CPT | Mod: 25 | Performed by: FAMILY MEDICINE

## 2023-09-13 RX ORDER — HYDROXYCHLOROQUINE SULFATE 200 MG/1
200 TABLET, FILM COATED ORAL DAILY
Qty: 90 TABLET | Refills: 1 | Status: SHIPPED | OUTPATIENT
Start: 2023-09-13

## 2023-09-13 RX ORDER — HYDROCHLOROTHIAZIDE 25 MG/1
25 TABLET ORAL DAILY
Qty: 90 TABLET | Refills: 0 | Status: SHIPPED | OUTPATIENT
Start: 2023-09-13 | End: 2023-12-12

## 2023-09-13 ASSESSMENT — ENCOUNTER SYMPTOMS
HEADACHES: 1
CONSTIPATION: 0
JOINT SWELLING: 1
ABDOMINAL PAIN: 0
NAUSEA: 0
SHORTNESS OF BREATH: 0
SHORTNESS OF BREATH: 1
WEAKNESS: 0
ARTHRALGIAS: 1
FREQUENCY: 0
PARESTHESIAS: 0
EYE PAIN: 0
HEARTBURN: 0
CHILLS: 0
DIARRHEA: 0
MYALGIAS: 1
BREAST MASS: 0
FEVER: 0
COUGH: 0
PALPITATIONS: 0
HEMATOCHEZIA: 0
SORE THROAT: 0
DYSURIA: 0
HEMATURIA: 0
NERVOUS/ANXIOUS: 0
DIZZINESS: 0

## 2023-09-13 ASSESSMENT — PAIN SCALES - GENERAL: PAINLEVEL: NO PAIN (0)

## 2023-09-13 NOTE — PROGRESS NOTES
SUBJECTIVE:   CC: Nessa is an 53 year old who presents for preventive health visit.       2023     1:25 PM   Additional Questions   Roomed by Nany SORENSEN CMA       Healthy Habits:     Getting at least 3 servings of Calcium per day:  Yes    Bi-annual eye exam:  NO    Dental care twice a year:  NO    Sleep apnea or symptoms of sleep apnea:  Daytime drowsiness    Diet:  Regular (no restrictions)    Frequency of exercise:  2-3 days/week    Duration of exercise:  30-45 minutes    Taking medications regularly:  Yes    Medication side effects:  None    Additional concerns today:  Yes    -Per her Endocrinologist, she should have her TSH checked annually by her PCP.   Patient also request and fasting lipid blood draw and sed rate.       Today's PHQ-2 Score:       2023     1:19 PM   PHQ-2 (  [employer])   Q1: Little interest or pleasure in doing things 1   Q2: Feeling down, depressed or hopeless 1   PHQ-2 Score 2   Q1: Little interest or pleasure in doing things Several days   Q2: Feeling down, depressed or hopeless Several days   PHQ-2 Score 2       Have you ever done Advance Care Planning? (For example, a Health Directive, POLST, or a discussion with a medical provider or your loved ones about your wishes): No, advance care planning information given to patient to review.  Patient declined advance care planning discussion at this time.    Social History     Tobacco Use    Smoking status: Former     Types: Cigarettes     Quit date:      Years since quittin.7     Passive exposure: Never    Smokeless tobacco: Never    Tobacco comments:     smoked once every few months, a few cigs.   Substance Use Topics    Alcohol use: Yes     Comment: occ             2023     1:19 PM   Alcohol Use   Prescreen: >3 drinks/day or >7 drinks/week? No     Reviewed orders with patient.  Reviewed health maintenance and updated orders accordingly - Yes  Lab work is in process  Labs reviewed in EPIC  BP Readings from Last 3  Encounters:   23 130/84   23 (!) 133/93   06/15/23 121/85    Wt Readings from Last 3 Encounters:   23 71.3 kg (157 lb 3.2 oz)   23 72.6 kg (160 lb)   06/15/23 72.6 kg (160 lb)                  Patient Active Problem List   Diagnosis    Hypoglycemia    Dysmenorrhea    Neck pain    Ganglion, joint    Hip pain    Dysfunctional uterine bleeding    GERD (gastroesophageal reflux disease)    CARDIOVASCULAR SCREENING; LDL GOAL LESS THAN 160    Menorrhagia    Pain in joint    Mild intermittent asthma without complication    Low TSH level    Inflammatory polyarthritis (H)    Injury of right knee, initial encounter    Acute pain of right knee     Past Surgical History:   Procedure Laterality Date    COLONOSCOPY N/A 1/3/2023    Procedure: COLONOSCOPY with biopsies and polypectomy;  Surgeon: Ki Cee MD;  Location: [facility]    HC REDUCTION OF LARGE BREAST  1997    HYSTERECTOMY VAGINAL Bilateral 2017    KNEE SURGERY      SURGICAL HISTORY OF -   1978    Rt knee X 2       Social History     Tobacco Use    Smoking status: Former     Types: Cigarettes     Quit date:      Years since quittin.7     Passive exposure: Never    Smokeless tobacco: Never    Tobacco comments:     smoked once every few months, a few cigs.   Substance Use Topics    Alcohol use: Yes     Comment: occ     Family History   Problem Relation Age of Onset    Neurologic Disorder Mother         tic    Cancer Mother         melanoma    C.A.D. Father     Diabetes Father     Hypertension Father     Arthritis Father     Cancer Father         pan    Neurologic Disorder Father     Coronary Artery Disease Father     Diabetes Maternal Grandmother     Cerebrovascular Disease Maternal Grandmother     Circulatory Maternal Grandfather     Gastrointestinal Disease Paternal Grandmother     Cancer - colorectal Paternal Grandmother     Heart Disease Paternal Grandmother     Alzheimer Disease Paternal Grandfather         pick's     Hypertension Brother          Current Outpatient Medications   Medication Sig Dispense Refill    albuterol (PROAIR HFA/PROVENTIL HFA/VENTOLIN HFA) 108 (90 Base) MCG/ACT inhaler Inhale 2 puffs into the lungs every 6 hours as needed for shortness of breath / dyspnea or wheezing 18 g 1    hydrochlorothiazide (HYDRODIURIL) 25 MG tablet Take 1 tablet (25 mg) by mouth daily 90 tablet 0    hydroxychloroquine (PLAQUENIL) 200 MG tablet Take 1 tablet (200 mg) by mouth daily 90 tablet 1    ALBUTEROL SULFATE (2.5 MG/3ML) 0.083% IN NEBU every 4 hours prn (Patient not taking: Reported on 9/13/2023) 1 Each 3     Allergies   Allergen Reactions    Oxycodone-Acetaminophen Other (See Comments)     Hallucinations     Recent Labs   Lab Test 09/14/22  1444 07/06/22  1411 05/24/22  1506 04/19/22  1043 03/02/22  1150 12/04/18  1454   0000   A1C  --   --   --   --  5.5  --   --    LDL  --   --   --   --   --  130*  --    HDL  --   --   --   --   --  66  --    TRIG  --   --   --   --   --  142  --    ALT  --  28  --   --   --   --   --    CR  --  0.83  --   --  0.64 0.76  --    GFRESTIMATED  --  84  --   --  >90 81  --    GFRESTBLACK  --   --   --   --   --  >90  --    POTASSIUM  --   --   --   --  4.3 3.8  --    TSH 1.66  --  2.46   < > 0.01* 1.24   < >    < > = values in this interval not displayed.        Breast Cancer Screening:    FHS-7:       3/4/2022    12:34 PM 9/13/2023     1:20 PM   Breast CA Risk Assessment (FHS-7)   Did any of your first-degree relatives have breast or ovarian cancer? No No   Did any of your relatives have bilateral breast cancer? No No   Did any man in your family have breast cancer? No No   Did any woman in your family have breast and ovarian cancer? No No   Did any woman in your family have breast cancer before age 50 y? No No   Do you have 2 or more relatives with breast and/or ovarian cancer? No No   Do you have 2 or more relatives with breast and/or bowel cancer? No Unknown     click delete button to remove  "this line now    Pertinent mammograms are reviewed under the imaging tab.    History of Pap smear: Last 3 Pap Results:   PAP (no units)   Date Value   06/03/2009 OTHER-NIL EM>40   04/24/2008 NIL         6/3/2009    12:00 AM 4/24/2008    12:00 AM   PAP / HPV   PAP (Historical) OTHER-NIL EM>40  NIL      Reviewed and updated as needed this visit by clinical staff   Tobacco  Allergies  Meds              Reviewed and updated as needed this visit by Provider                     Review of Systems   Constitutional:  Negative for chills and fever.   HENT:  Positive for hearing loss. Negative for congestion, ear pain and sore throat.    Eyes:  Negative for pain and visual disturbance.   Respiratory:  Negative for cough and shortness of breath.    Cardiovascular:  Positive for peripheral edema. Negative for chest pain and palpitations.   Gastrointestinal:  Negative for abdominal pain, constipation, diarrhea, heartburn, hematochezia and nausea.   Breasts:  Negative for tenderness, breast mass and discharge.   Genitourinary:  Negative for dysuria, frequency, genital sores, hematuria, pelvic pain, urgency, vaginal bleeding and vaginal discharge.   Musculoskeletal:  Positive for arthralgias, joint swelling and myalgias.   Skin:  Negative for rash.   Allergic/Immunologic: Negative for environmental allergies and food allergies.   Neurological:  Positive for headaches. Negative for dizziness, weakness and paresthesias.   Psychiatric/Behavioral:  Positive for mood changes. The patient is not nervous/anxious.        OBJECTIVE:   /84 (BP Location: Right arm, Patient Position: Sitting, Cuff Size: Adult Regular)   Pulse 102   Temp 98.1  F (36.7  C) (Tympanic)   Resp 22   Ht 1.575 m (5' 2\")   Wt 71.3 kg (157 lb 3.2 oz)   LMP 09/28/2011   SpO2 98%   BMI 28.75 kg/m    Physical Exam  GENERAL: alert and no distress  EYES: Eyes grossly normal to inspection, PERRL and conjunctivae and sclerae normal  HENT: normal " cephalic/atraumatic, nose and mouth without ulcers or lesions, oropharynx clear, and oral mucous membranes moist  NECK: no adenopathy, no asymmetry, masses, or scars and thyroid normal to palpation  RESP: lungs clear to auscultation - no rales, rhonchi or wheezes  CV: regular rate and rhythm, normal S1 S2, no S3 or S4, no murmur, click or rub  ABDOMEN: soft, nontender, no hepatosplenomegaly, mild pedal edema bilaterally  MS: no gross musculoskeletal defects noted  SKIN: no suspicious lesions or rashes  NEURO: Normal strength and tone, mentation intact and speech normal  PSYCH: mentation appears normal, affect normal/bright      ASSESSMENT/PLAN:   (Z00.00) Routine history and physical examination of adult  (primary encounter diagnosis)  Comment: Medically doing well.  Medications reviewed and no changes made.  Hydroxychloroquine and hydrochlorothiazide refilled.  Patient deferred routine vaccines.  Recommended regular exercise, healthy diet and weight loss      (E78.2) Mixed hyperlipidemia  Comment: Recommended regular exercise, healthy diet and weight loss  The 10-year ASCVD risk score (Louis PATEL, et al., 2019) is: 1.8%    Values used to calculate the score:      Age: 53 years      Sex: Female      Is Non- : No      Diabetic: No      Tobacco smoker: No      Systolic Blood Pressure: 130 mmHg      Is BP treated: No      HDL Cholesterol: 66 mg/dL      Total Cholesterol: 224 mg/dL  Plan: Lipid panel reflex to direct LDL Fasting            (R79.89) Low TSH level  Comment:   Plan: TSH with free T4 reflex            (M06.4) Inflammatory polyarthritis (H)  Comment: Following rheumatology  Plan: hydroxychloroquine (PLAQUENIL) 200 MG tablet,         ESR: Erythrocyte sedimentation rate          COUNSELING:  Reviewed preventive health counseling, as reflected in patient instructions      BMI:   Estimated body mass index is 28.75 kg/m  as calculated from the following:    Height as of this encounter:  "1.575 m (5' 2\").    Weight as of this encounter: 71.3 kg (157 lb 3.2 oz).   Weight management plan: Discussed healthy diet and exercise guidelines      She reports that she quit smoking about 22 years ago. Her smoking use included cigarettes. She has never been exposed to tobacco smoke. She has never used smokeless tobacco.            Dinesh Blake MD  [facility]  "

## 2023-12-07 ENCOUNTER — NURSE TRIAGE (OUTPATIENT)
Dept: FAMILY MEDICINE | Facility: CLINIC | Age: 53
End: 2023-12-07
Payer: COMMERCIAL

## 2023-12-07 NOTE — TELEPHONE ENCOUNTER
Per Dr. Blake:    Will recommend patient to be seen in ER for tachycardia evaluation.     Dr Blake         Patient notified.      OZZIE Pena

## 2023-12-07 NOTE — TELEPHONE ENCOUNTER
"Dr. Blake:    Please see my chart sent in today, patient was called and triaged, she is reporting tachycardia which started 2 weeks ago and getting worse, happening 3-4 times per day, she feels shaky, anxious, feels like her heart is skipping a beat and pounding in her chest, she reports she is occasionally dizzy and short of breath but not at time of call, she is not having chest pain, says this is how she felt like when her thyroid felt off 2 years ago. Current heart rate is 116 but has been as high as 164.    Patient was advised to be seen in the ER to rule out cardiac causes, says she has had an echo this past April and wondering if she should just have thyroid lab checked.    Please provide second level triage for next steps, thank you.        OZZIE Pena        Reason for Disposition   Heart beating very rapidly (e.g., > 140 / minute) and not present now  (Exception: During exercise.)    Answer Assessment - Initial Assessment Questions  1. DESCRIPTION: \"Please describe your heart rate or heartbeat that you are having\" (e.g., fast/slow, regular/irregular, skipped or extra beats, \"palpitations\")      Patient reports her heart feels like it is pounding, says she has had similar symptoms as when her thyroid was off 2 years ago.  2. ONSET: \"When did it start?\" (Minutes, hours or days)       Patient reports for the past few weeks patient has been feeling her heart racing, says she has checked her heart rate and has been as high as 154 resting, today it is 116.  3. DURATION: \"How long does it last\" (e.g., seconds, minutes, hours)      Off and on and occurring 3-4 times per day.  4. PATTERN \"Does it come and go, or has it been constant since it started?\"  \"Does it get worse with exertion?\"   \"Are you feeling it now?\"      Comes and goes, she is currently feeling anxious and that her heart is pounding out of her chest.  5. TAP: \"Using your hand, can you tap out what you are feeling on a chair or table in front of you, " "so that I can hear?\" (Note: not all patients can do this)        Patient reports heart rate is 116 now.  6. HEART RATE: \"Can you tell me your heart rate?\" \"How many beats in 15 seconds?\"  (Note: not all patients can do this)        See above.  7. RECURRENT SYMPTOM: \"Have you ever had this before?\" If Yes, ask: \"When was the last time?\" and \"What happened that time?\"       Last time she felt this way was when her thyroid was off 2 years ago.  8. CAUSE: \"What do you think is causing the palpitations?\"      Questioning that her thyroid labs need to be checked.  9. CARDIAC HISTORY: \"Do you have any history of heart disease?\" (e.g., heart attack, angina, bypass surgery, angioplasty, arrhythmia)       See health history.  10. OTHER SYMPTOMS: \"Do you have any other symptoms?\" (e.g., dizziness, chest pain, sweating, difficulty breathing)        Occasionally is dizzy, occasionally short of breath, she is not short of breath at time of call but is very anxious. Feels like she has to hold her breath. She states her heart rate has been running  and as high as 164. She feels shaky and symptoms worsen in the shower.  11. PREGNANCY: \"Is there any chance you are pregnant?\" \"When was your last menstrual period?\"        na    Protocols used: Heart Rate and Heartbeat [id number]    "

## 2023-12-08 ENCOUNTER — LAB (OUTPATIENT)
Dept: LAB | Facility: CLINIC | Age: 53
End: 2023-12-08
Payer: COMMERCIAL

## 2023-12-08 DIAGNOSIS — E05.90 THYROTOXICOSIS WITHOUT THYROID STORM, UNSPECIFIED THYROTOXICOSIS TYPE: Primary | ICD-10-CM

## 2023-12-08 DIAGNOSIS — E05.90 THYROTOXICOSIS WITHOUT THYROID STORM, UNSPECIFIED THYROTOXICOSIS TYPE: ICD-10-CM

## 2023-12-08 LAB
T3 SERPL-MCNC: 161 NG/DL (ref 85–202)
T4 FREE SERPL-MCNC: 1.87 NG/DL (ref 0.9–1.7)
TSH SERPL DL<=0.005 MIU/L-ACNC: <0.01 UIU/ML (ref 0.3–4.2)

## 2023-12-08 PROCEDURE — 84445 ASSAY OF TSI GLOBULIN: CPT | Mod: 90

## 2023-12-08 PROCEDURE — 36415 COLL VENOUS BLD VENIPUNCTURE: CPT

## 2023-12-08 PROCEDURE — 83520 IMMUNOASSAY QUANT NOS NONAB: CPT | Mod: 90

## 2023-12-08 PROCEDURE — 84443 ASSAY THYROID STIM HORMONE: CPT

## 2023-12-08 PROCEDURE — 84439 ASSAY OF FREE THYROXINE: CPT

## 2023-12-08 PROCEDURE — 84480 ASSAY TRIIODOTHYRONINE (T3): CPT

## 2023-12-08 PROCEDURE — 99000 SPECIMEN HANDLING OFFICE-LAB: CPT

## 2023-12-08 RX ORDER — PROPRANOLOL HCL 60 MG
60 CAPSULE, EXTENDED RELEASE 24HR ORAL DAILY
Qty: 60 CAPSULE | Refills: 0 | Status: SHIPPED | OUTPATIENT
Start: 2023-12-08 | End: 2024-02-05

## 2023-12-11 LAB — TSH RECEP AB SER-ACNC: 2.56 IU/L (ref 0–1.75)

## 2023-12-12 ENCOUNTER — VIRTUAL VISIT (OUTPATIENT)
Dept: FAMILY MEDICINE | Facility: CLINIC | Age: 53
End: 2023-12-12
Payer: COMMERCIAL

## 2023-12-12 DIAGNOSIS — R60.0 PEDAL EDEMA: ICD-10-CM

## 2023-12-12 DIAGNOSIS — E05.90 HYPERTHYROIDISM: Primary | ICD-10-CM

## 2023-12-12 PROCEDURE — 99214 OFFICE O/P EST MOD 30 MIN: CPT | Mod: VID | Performed by: FAMILY MEDICINE

## 2023-12-12 RX ORDER — HYDROCHLOROTHIAZIDE 25 MG/1
25 TABLET ORAL PRN
Qty: 90 TABLET | Refills: 3 | Status: SHIPPED | OUTPATIENT
Start: 2023-12-12

## 2023-12-12 ASSESSMENT — ASTHMA QUESTIONNAIRES
ACT_TOTALSCORE: 25
QUESTION_5 LAST FOUR WEEKS HOW WOULD YOU RATE YOUR ASTHMA CONTROL: COMPLETELY CONTROLLED
ACT_TOTALSCORE: 25
QUESTION_1 LAST FOUR WEEKS HOW MUCH OF THE TIME DID YOUR ASTHMA KEEP YOU FROM GETTING AS MUCH DONE AT WORK, SCHOOL OR AT HOME: NONE OF THE TIME
QUESTION_4 LAST FOUR WEEKS HOW OFTEN HAVE YOU USED YOUR RESCUE INHALER OR NEBULIZER MEDICATION (SUCH AS ALBUTEROL): NOT AT ALL
QUESTION_3 LAST FOUR WEEKS HOW OFTEN DID YOUR ASTHMA SYMPTOMS (WHEEZING, COUGHING, SHORTNESS OF BREATH, CHEST TIGHTNESS OR PAIN) WAKE YOU UP AT NIGHT OR EARLIER THAN USUAL IN THE MORNING: NOT AT ALL
QUESTION_2 LAST FOUR WEEKS HOW OFTEN HAVE YOU HAD SHORTNESS OF BREATH: NOT AT ALL

## 2023-12-12 NOTE — PROGRESS NOTES
Nessa is a 53 year old who is being evaluated via a billable video visit.      How would you like to obtain your AVS? MyChart  If the video visit is dropped, the invitation should be resent by: Text to cell phone: [phone]  Will anyone else be joining your video visit? No          Assessment & Plan     Pedal edema  Stable no change in treatment plan.      - hydrochlorothiazide (HYDRODIURIL) 25 MG tablet; Take 1 tablet (25 mg) by mouth as needed (edma)    Hyperthyroidism  Has endo set up for Friday   Stay on propranolol for now     Labs and ER visit reviewed            MED REC REQUIRED  Post Medication Reconciliation Status: discharge medications reconciled, continue medications without change      Mita Delgadillo MD  [facility]    Subjective   Nessa is a 53 year old, presenting for the following health issues:  ER F/U        12/12/2023     6:53 AM   Additional Questions   Roomed by Shauna ROSENBAUM   Accompanied by Self         12/12/2023     6:53 AM   Patient Reported Additional Medications   Patient reports taking the following new medications .       HPI     ED/UC Followup:    Facility:  [facility]  Date of visit: 12/7/23  Reason for visit: Palpitations  Current Status: Pt was given propanolol and has helped and has follow up with Endocrinology 12/15/23    Component      Latest Ref Rng 12/8/2023  2:09 PM   Thyrotropin Receptor Antibody      0.00 - 1.75 IU/L 2.56 (H)    Triiodothyronine (T3)      85 - 202 ng/dL 161    T4 Free      0.90 - 1.70 ng/dL 1.87 (H)    TSH      0.30 - 4.20 uIU/mL <0.01 (L)       Legend:  (H) High  (L) Low  Plan:   Offered f/u with a primary care provider here at [facility] to address low TSH, declined because of insurance concerns. Will call her PCP at [facility]  Consider repeating the Zio patch  F/U with Endocrinologist     Doing really well   On the propranolol  Feels night and day difference   Has endo set up for Friday         Needs hydrochlorothiazide filled        Review of Systems   Constitutional, HEENT, cardiovascular, pulmonary, gi and gu systems are negative, except as otherwise noted.      Objective           Vitals:  No vitals were obtained today due to virtual visit.    Physical Exam   GENERAL: Healthy, alert and no distress  EYES: Eyes grossly normal to inspection.  No discharge or erythema, or obvious scleral/conjunctival abnormalities.  RESP: No audible wheeze, cough, or visible cyanosis.  No visible retractions or increased work of breathing.    SKIN: Visible skin clear. No significant rash, abnormal pigmentation or lesions.  NEURO: Cranial nerves grossly intact.  Mentation and speech appropriate for age.  PSYCH: Mentation appears normal, affect normal/bright, judgement and insight intact, normal speech and appearance well-groomed.                Video-Visit Details    Type of service:  Video Visit     Originating Location (pt. Location): Home    Distant Location (provider location):  On-site  Platform used for Video Visit: Vringo

## 2023-12-14 LAB — TSI SER-ACNC: 2.7 TSI INDEX

## 2023-12-15 ENCOUNTER — VIRTUAL VISIT (OUTPATIENT)
Dept: ENDOCRINOLOGY | Facility: CLINIC | Age: 53
End: 2023-12-15
Payer: COMMERCIAL

## 2023-12-15 DIAGNOSIS — F41.9 ANXIETY: ICD-10-CM

## 2023-12-15 DIAGNOSIS — R00.2 PALPITATIONS: ICD-10-CM

## 2023-12-15 DIAGNOSIS — E05.00 GRAVES DISEASE: Primary | ICD-10-CM

## 2023-12-15 PROCEDURE — 99214 OFFICE O/P EST MOD 30 MIN: CPT | Mod: VID | Performed by: INTERNAL MEDICINE

## 2023-12-15 RX ORDER — METHIMAZOLE 5 MG/1
5 TABLET ORAL DAILY
Qty: 90 TABLET | Refills: 1 | Status: SHIPPED | OUTPATIENT
Start: 2023-12-15 | End: 2024-05-03

## 2023-12-15 NOTE — PROGRESS NOTES
Video visit    Start time 1:07, stop time 1:26; additional 14 minutes spent on the date of the encounter doing chart review, documentation and further activities as noted.     Provider location: [facility], [address] [place] [address] [address] [address]    Patient location: patient home    Mode of transmission: video     Subjective:    Established patient. The following is a comprehensive summary of her Endocrine care to date.     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    12/15/2023: About 3 weeks ago she developed symptoms including palpitations, anxiety, irritability, hyperdefecation. She started propranolol 60 mg ER about 1 week ago and notes it's helping. No use of biotin. No symptoms to suggest LYNDSEY.     12/8/2023: TSH undetectable, free T4 mildly elevated, total T3 high normal, TSI 2.7 (ref range <=1.3 TSI index), TRAb 2.56 (ULN 1.75 international unit(s)/L)     12/2023: WBC normal, LFTs normal     9/13/2023: TSH 1.34        3/2022: TSI undetectable  4/2022: TPO Ab elevated at 94 (ULN 6 IU/mL), TRAb within the normal range     11/2021: she had several week of upper respiratory tract symptoms  COVID-19 positive 1/24/2022.   She developed symptoms around mid 2/2022 including palpitations, insomnia, anxiety, tremors, hyper defecation. She did not have any neck pain, swelling, tenderness.     The lab draw 3/2/2022 was the first time she was found to have thyroid dysfunction. She had never previously been on thyroid hormone therapy or ATD outside of brief use of methimazole in 4/2022 - see below. No prior thyroid surgeries. No prior H/N radiation. No prior JEREZ therapy.     3/18/2022: NM U/S, JEREZ uptake at 24 hours: 0.8%. Homogeneous isotope distribution in a gland which is difficult to see due to the low radiotracer uptake. I reviewed the images in detail and agree with the radiology read.     Methimazole taken 4/8/2022 - 4/19/2022 (stopped due to  thyroiditis as etiology)    Thyroid US 4/19/2022: I reviewed the images and agree with the radiology read as below  -RIGHT lobe: 4.9 x 1.7 x 1.3 cm. Mildly heterogeneous echotexture without focal nodule. Color Doppler demonstrates hyperemia.  -Isthmus: 3 mm.  -LEFT lobe: 5.1 x 1.7 x 1.2 cm. Mildly heterogeneous echotexture without focal nodule. Color Doppler demonstrates mild hyperemia.  -NECK: No cervical lymphadenopathy.     She has no known history of thyroid nodules.     She does not take iodine, or eat seaweed or kelp. CTA chest 12/7/2023, per radiology - unremarkable.      No FH of thyroid disease.      No tobacco use. Rare alcohol use.    She has a known inflammatory polyarthritis. Prednisone burst about 7 months ago, no other recent GC exposure.     She had a hysterectomy (ovaries left in situ) and hot flashes started ~2021. She was not given estrogen.     Objective:    Video visit today. Appears well. No LYNDSEY. No obvious goiter.     4/2022: BMI 28.17 kg/meters squared.  Blood pressure 110/80.  She appears well.  Regular rate and rhythm.  She does have conjunctival inflammation and using an exophthalmometer I measured the right eye at 20 mm and left at 19 mm with upper limit of normal in a  woman being 19 mm.  Clinical activity score is at most 1.  The thyroid is not inflamed.  There is some fullness in the lower anterior right neck but no discrete thyroid nodularity.  No cervical lymphadenopathy.  Breathing comfortably on room air.  No significant lower extremity edema.    Assessment/Plan:    # Graves disease  # No thyroid eye disease     We reviewed the pathophysiology and natural history of Graves' disease.  We reviewed treatment options in detail including antithyroid drug therapy, radioactive iodine ablation, and thyroidectomy.  Nessa has a preference for methimazole and I agree with this approach.  I prescribed methimazole 5 mg daily.  We reviewed precautions and possible adverse effects with  methimazole.  She will continue propranolol 60 mg ER once daily for now.  She will monitor for thyroid eye disease.  Repeat thyroid function testing in 4 to 6 weeks and she will send me a [employer] message when complete so I can review.  I will see her back over the summer 2024.

## 2023-12-15 NOTE — LETTER
12/15/2023         RE: Nessa Briceño  [address] [facility] [address]        Dear Colleague,    Thank you for referring your patient, Nessa Briceño, to the [facility]. Please see a copy of my visit note below.    Video visit    Start time 1:07, stop time 1:26; additional 14 minutes spent on the date of the encounter doing chart review, documentation and further activities as noted.     Provider location: [facility], [address] [place] [address] [address], [place] [address]    Patient location: patient home    Mode of transmission: video     Subjective:    Established patient. The following is a comprehensive summary of her Endocrine care to date.     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    12/15/2023: About 3 weeks ago she developed symptoms including palpitations, anxiety, irritability, hyperdefecation. She started propranolol 60 mg ER about 1 week ago and notes it's helping. No use of biotin. No symptoms to suggest LYNDSEY.     12/8/2023: TSH undetectable, free T4 mildly elevated, total T3 high normal, TSI 2.7 (ref range <=1.3 TSI index), TRAb 2.56 (ULN 1.75 international unit(s)/L)     12/2023: WBC normal, LFTs normal     9/13/2023: TSH 1.34        3/2022: TSI undetectable  4/2022: TPO Ab elevated at 94 (ULN 6 IU/mL), TRAb within the normal range     11/2021: she had several week of upper respiratory tract symptoms  COVID-19 positive 1/24/2022.   She developed symptoms around mid 2/2022 including palpitations, insomnia, anxiety, tremors, hyper defecation. She did not have any neck pain, swelling, tenderness.     The lab draw 3/2/2022 was the first time she was found to have thyroid dysfunction. She had never previously been on thyroid hormone therapy or ATD outside of brief use of methimazole in 4/2022 - see below. No prior thyroid surgeries. No prior H/N radiation. No prior JEREZ therapy.     3/18/2022: NM U/S, JEREZ uptake at 24 hours:  0.8%. Homogeneous isotope distribution in a gland which is difficult to see due to the low radiotracer uptake. I reviewed the images in detail and agree with the radiology read.     Methimazole taken 4/8/2022 - 4/19/2022 (stopped due to thyroiditis as etiology)    Thyroid US 4/19/2022: I reviewed the images and agree with the radiology read as below  -RIGHT lobe: 4.9 x 1.7 x 1.3 cm. Mildly heterogeneous echotexture without focal nodule. Color Doppler demonstrates hyperemia.  -Isthmus: 3 mm.  -LEFT lobe: 5.1 x 1.7 x 1.2 cm. Mildly heterogeneous echotexture without focal nodule. Color Doppler demonstrates mild hyperemia.  -NECK: No cervical lymphadenopathy.     She has no known history of thyroid nodules.     She does not take iodine, or eat seaweed or kelp. CTA chest 12/7/2023, per radiology - unremarkable.      No FH of thyroid disease.      No tobacco use. Rare alcohol use.    She has a known inflammatory polyarthritis. Prednisone burst about 7 months ago, no other recent GC exposure.     She had a hysterectomy (ovaries left in situ) and hot flashes started ~2021. She was not given estrogen.     Objective:    Video visit today. Appears well. No LYNDSEY. No obvious goiter.     4/2022: BMI 28.17 kg/meters squared.  Blood pressure 110/80.  She appears well.  Regular rate and rhythm.  She does have conjunctival inflammation and using an exophthalmometer I measured the right eye at 20 mm and left at 19 mm with upper limit of normal in a  woman being 19 mm.  Clinical activity score is at most 1.  The thyroid is not inflamed.  There is some fullness in the lower anterior right neck but no discrete thyroid nodularity.  No cervical lymphadenopathy.  Breathing comfortably on room air.  No significant lower extremity edema.    Assessment/Plan:    # Graves disease  # No thyroid eye disease     We reviewed the pathophysiology and natural history of Graves' disease.  We reviewed treatment options in detail including  antithyroid drug therapy, radioactive iodine ablation, and thyroidectomy.  Nessa has a preference for methimazole and I agree with this approach.  I prescribed methimazole 5 mg daily.  We reviewed precautions and possible adverse effects with methimazole.  She will continue propranolol 60 mg ER once daily for now.  She will monitor for thyroid eye disease.  Repeat thyroid function testing in 4 to 6 weeks and she will send me a [employer] message when complete so I can review.  I will see her back over the summer 2024.        Again, thank you for allowing me to participate in the care of your patient.        Sincerely,        Jean-Claude Ragsdale MD

## 2024-01-17 ENCOUNTER — LAB (OUTPATIENT)
Dept: LAB | Facility: CLINIC | Age: 54
End: 2024-01-17
Payer: COMMERCIAL

## 2024-01-17 DIAGNOSIS — E05.00 GRAVES DISEASE: ICD-10-CM

## 2024-01-17 LAB
T4 FREE SERPL-MCNC: 1.56 NG/DL (ref 0.9–1.7)
TSH SERPL DL<=0.005 MIU/L-ACNC: <0.01 UIU/ML (ref 0.3–4.2)

## 2024-01-17 PROCEDURE — 36415 COLL VENOUS BLD VENIPUNCTURE: CPT

## 2024-01-17 PROCEDURE — 84480 ASSAY TRIIODOTHYRONINE (T3): CPT

## 2024-01-17 PROCEDURE — 84439 ASSAY OF FREE THYROXINE: CPT

## 2024-01-17 PROCEDURE — 84443 ASSAY THYROID STIM HORMONE: CPT

## 2024-01-18 LAB — T3 SERPL-MCNC: 125 NG/DL (ref 85–202)

## 2024-01-19 DIAGNOSIS — E05.00 GRAVES DISEASE: Primary | ICD-10-CM

## 2024-02-05 ENCOUNTER — PATIENT OUTREACH (OUTPATIENT)
Dept: CARE COORDINATION | Facility: CLINIC | Age: 54
End: 2024-02-05
Payer: COMMERCIAL

## 2024-02-05 DIAGNOSIS — E05.90 THYROTOXICOSIS WITHOUT THYROID STORM, UNSPECIFIED THYROTOXICOSIS TYPE: ICD-10-CM

## 2024-02-05 RX ORDER — PROPRANOLOL HCL 60 MG
60 CAPSULE, EXTENDED RELEASE 24HR ORAL DAILY
Qty: 90 CAPSULE | Refills: 1 | Status: SHIPPED | OUTPATIENT
Start: 2024-02-05 | End: 2024-07-30

## 2024-02-05 NOTE — TELEPHONE ENCOUNTER
Requested Prescriptions   Pending Prescriptions Disp Refills    propranolol ER (INDERAL LA) 60 MG 24 hr capsule [Pharmacy Med Name: propranolol ER 60 mg capsule,24 hr,extended release] 60 capsule 0     Sig: TAKE 1 CAPSULE BY MOUTH EVERY DAY       Beta-Blockers Protocol Failed - 2/5/2024 10:14 AM        Failed - Medication indicated for associated diagnosis     Medication is associated with one or more of the following diagnoses:     Hypertension (HTN)   Atrial fibrillation/flutter   Angina   ASCVD   Migraine   Heart Failure   Tremor   Anxiety   Ocular hypertension   Glaucoma          Passed - Blood pressure under 140/90 in past 12 months     BP Readings from Last 3 Encounters:   09/13/23 130/84   07/05/23 (!) 133/93   06/15/23 121/85                 Passed - Patient is age 6 or older        Passed - Recent (12 mo) or future (30 days) visit within the authorizing provider's specialty     The patient must have completed an in-person or virtual visit within the past 12 months or has a future visit scheduled within the next 90 days with the authorizing provider s specialty.  Urgent care and e-visits do not quality as an office visit for this protocol.          Passed - Medication is active on med list

## 2024-03-12 ENCOUNTER — LAB (OUTPATIENT)
Dept: LAB | Facility: CLINIC | Age: 54
End: 2024-03-12
Payer: COMMERCIAL

## 2024-03-12 DIAGNOSIS — E05.00 GRAVES DISEASE: ICD-10-CM

## 2024-03-12 LAB
T4 FREE SERPL-MCNC: 0.95 NG/DL (ref 0.9–1.7)
TSH SERPL DL<=0.005 MIU/L-ACNC: 1.67 UIU/ML (ref 0.3–4.2)

## 2024-03-12 PROCEDURE — 84443 ASSAY THYROID STIM HORMONE: CPT

## 2024-03-12 PROCEDURE — 84480 ASSAY TRIIODOTHYRONINE (T3): CPT

## 2024-03-12 PROCEDURE — 84439 ASSAY OF FREE THYROXINE: CPT

## 2024-03-12 PROCEDURE — 36415 COLL VENOUS BLD VENIPUNCTURE: CPT

## 2024-03-13 LAB — T3 SERPL-MCNC: 112 NG/DL (ref 85–202)

## 2024-03-18 DIAGNOSIS — E05.00 GRAVES DISEASE: Primary | ICD-10-CM

## 2024-04-04 ENCOUNTER — TELEPHONE (OUTPATIENT)
Dept: FAMILY MEDICINE | Facility: CLINIC | Age: 54
End: 2024-04-04
Payer: COMMERCIAL

## 2024-04-04 DIAGNOSIS — M25.561 ACUTE PAIN OF RIGHT KNEE: Primary | ICD-10-CM

## 2024-04-04 DIAGNOSIS — M23.203 DEGENERATIVE TEAR OF MEDIAL MENISCUS OF RIGHT KNEE: ICD-10-CM

## 2024-04-04 DIAGNOSIS — Z98.890 S/P KNEE SURGERY: ICD-10-CM

## 2024-04-04 NOTE — TELEPHONE ENCOUNTER
Order/Referral Request    Who is requesting: Patient    Orders being requested: Physical therapy. Patient was given a physical therapy referral by Dr. Erazo. However her insurance wont cover her physical therapy visits unless its placed by her PCP. Patient is asking for Physical therapy referral to be back dated to 5/26/2023      Does patient have a preference on a Group/Provider/Facility? Needs to be submited to insurance company. Patient will call back with information         Could we send this information to you in [facility] or would you prefer to receive a phone call?:   Patient would prefer a phone call   Okay to leave a detailed message?: Yes at Home number on file [phone] (home)    
"05-26-23 PT appt per Dr. Erazo, Dx    Acute pain of right knee   Degenerative tear of medial meniscus of right knee   S/P knee surgery    Pt states completed entire PT, effective results. Did not need to have surg.  Insurance is requiring PT referral from PCP past dated and insurance will access \"through portal\".  Past dated PT referral pended, forwarded to Dr. Delgadillo for review/ auth.  KATYA Harrison RN    "
Detail Level: Simple
Consent: The patient's consent was obtained including but not limited to risks of crusting, scabbing, blistering, scarring, darker or lighter pigmentary change, recurrence, incomplete removal and infection.
Post-Care Instructions: I reviewed with the patient in detail post-care instructions. Patient is to wear sunprotection, and avoid picking at any of the treated lesions. Pt may apply Vaseline to crusted or scabbing areas.
Render Post-Care Instructions In Note?: no
Duration Of Freeze Thaw-Cycle (Seconds): 0

## 2024-05-03 ENCOUNTER — MYC REFILL (OUTPATIENT)
Dept: ENDOCRINOLOGY | Facility: CLINIC | Age: 54
End: 2024-05-03
Payer: COMMERCIAL

## 2024-05-03 DIAGNOSIS — E05.00 GRAVES DISEASE: ICD-10-CM

## 2024-05-06 RX ORDER — METHIMAZOLE 5 MG/1
5 TABLET ORAL DAILY
Qty: 30 TABLET | Refills: 0 | Status: SHIPPED | OUTPATIENT
Start: 2024-05-06 | End: 2024-05-06

## 2024-05-06 RX ORDER — METHIMAZOLE 5 MG/1
TABLET ORAL
Qty: 60 TABLET | Refills: 0 | Status: SHIPPED | OUTPATIENT
Start: 2024-05-06

## 2024-05-06 NOTE — TELEPHONE ENCOUNTER
Pt needs labs between now and  mid June     Requested Prescriptions   Pending Prescriptions Disp Refills    methimazole (TAPAZOLE) 5 MG tablet 90 tablet 1     Sig: Take 1 tablet (5 mg) by mouth daily       Anti-Thyroid Agents Protocol Passed - 5/3/2024  8:55 PM        Passed - Medication is active on the patient's med list        Passed - Recent (12 month) or future (90 days) visit with authorizing provider s specialty     The patient must have completed an in-person or virtual visit within the past 12 months or has a future visit scheduled within the next 90 days with the authorizing provider s specialty.  Urgent care and e-visits do not quality as an office visit for this protocol.          Passed - Medication indicated for associated diagnosis     The medication is prescribed for one or more of the following conditions:        Hyperthyroidism       Disorder of thyroid gland       Thyrotoxicosis       Thyroid storm       Thyroid eye disease            Passed - Patient is age 18 or older        Passed - Normal TSH in past 12 months     Recent Labs   Lab Test 03/12/24  1410   TSH 1.67              Passed - No active pregnancy on record        Passed - No positive pregnancy test in past 12 months

## 2024-05-06 NOTE — TELEPHONE ENCOUNTER
Patient will schedule labs online with what works with her work scheduled.  Patient is calling to get update on refill request based on upped dosage.

## 2024-06-01 ENCOUNTER — LAB (OUTPATIENT)
Dept: LAB | Facility: CLINIC | Age: 54
End: 2024-06-01
Payer: COMMERCIAL

## 2024-06-01 DIAGNOSIS — E05.00 GRAVES DISEASE: ICD-10-CM

## 2024-06-01 LAB
T4 FREE SERPL-MCNC: 0.98 NG/DL (ref 0.9–1.7)
TSH SERPL DL<=0.005 MIU/L-ACNC: 1.08 UIU/ML (ref 0.3–4.2)

## 2024-06-01 PROCEDURE — 84439 ASSAY OF FREE THYROXINE: CPT

## 2024-06-01 PROCEDURE — 84480 ASSAY TRIIODOTHYRONINE (T3): CPT

## 2024-06-01 PROCEDURE — 84443 ASSAY THYROID STIM HORMONE: CPT

## 2024-06-01 PROCEDURE — 36415 COLL VENOUS BLD VENIPUNCTURE: CPT

## 2024-06-02 LAB — T3 SERPL-MCNC: 99 NG/DL (ref 85–202)

## 2024-06-22 ENCOUNTER — HEALTH MAINTENANCE LETTER (OUTPATIENT)
Age: 54
End: 2024-06-22

## 2024-07-29 ENCOUNTER — OFFICE VISIT (OUTPATIENT)
Dept: FAMILY MEDICINE | Facility: CLINIC | Age: 54
End: 2024-07-29
Payer: COMMERCIAL

## 2024-07-29 ENCOUNTER — TELEPHONE (OUTPATIENT)
Dept: FAMILY MEDICINE | Facility: CLINIC | Age: 54
End: 2024-07-29

## 2024-07-29 VITALS
HEART RATE: 70 BPM | DIASTOLIC BLOOD PRESSURE: 78 MMHG | RESPIRATION RATE: 20 BRPM | WEIGHT: 155.6 LBS | TEMPERATURE: 97.8 F | BODY MASS INDEX: 28.63 KG/M2 | SYSTOLIC BLOOD PRESSURE: 118 MMHG | HEIGHT: 62 IN | OXYGEN SATURATION: 97 %

## 2024-07-29 DIAGNOSIS — E66.3 OVERWEIGHT: Primary | ICD-10-CM

## 2024-07-29 DIAGNOSIS — Z12.31 VISIT FOR SCREENING MAMMOGRAM: ICD-10-CM

## 2024-07-29 DIAGNOSIS — M06.4 INFLAMMATORY POLYARTHRITIS (H): ICD-10-CM

## 2024-07-29 DIAGNOSIS — E66.3 OVERWEIGHT: ICD-10-CM

## 2024-07-29 DIAGNOSIS — G89.29 CHRONIC PAIN OF RIGHT KNEE: ICD-10-CM

## 2024-07-29 DIAGNOSIS — M25.561 CHRONIC PAIN OF RIGHT KNEE: ICD-10-CM

## 2024-07-29 PROCEDURE — 99214 OFFICE O/P EST MOD 30 MIN: CPT | Performed by: FAMILY MEDICINE

## 2024-07-29 RX ORDER — LIRAGLUTIDE 6 MG/ML
INJECTION, SOLUTION SUBCUTANEOUS
Qty: 10.5 ML | Refills: 0 | Status: SHIPPED | OUTPATIENT
Start: 2024-07-29 | End: 2024-07-31

## 2024-07-29 ASSESSMENT — ASTHMA QUESTIONNAIRES
ACT_TOTALSCORE: 21
QUESTION_4 LAST FOUR WEEKS HOW OFTEN HAVE YOU USED YOUR RESCUE INHALER OR NEBULIZER MEDICATION (SUCH AS ALBUTEROL): ONCE A WEEK OR LESS
QUESTION_1 LAST FOUR WEEKS HOW MUCH OF THE TIME DID YOUR ASTHMA KEEP YOU FROM GETTING AS MUCH DONE AT WORK, SCHOOL OR AT HOME: A LITTLE OF THE TIME
ACT_TOTALSCORE: 21
QUESTION_2 LAST FOUR WEEKS HOW OFTEN HAVE YOU HAD SHORTNESS OF BREATH: ONCE OR TWICE A WEEK
QUESTION_3 LAST FOUR WEEKS HOW OFTEN DID YOUR ASTHMA SYMPTOMS (WHEEZING, COUGHING, SHORTNESS OF BREATH, CHEST TIGHTNESS OR PAIN) WAKE YOU UP AT NIGHT OR EARLIER THAN USUAL IN THE MORNING: NOT AT ALL
QUESTION_5 LAST FOUR WEEKS HOW WOULD YOU RATE YOUR ASTHMA CONTROL: WELL CONTROLLED

## 2024-07-29 ASSESSMENT — PAIN SCALES - GENERAL: PAINLEVEL: SEVERE PAIN (6)

## 2024-07-29 NOTE — PROGRESS NOTES
Video visit    Start time 9:41, stop time 9:55; additional 10 minutes spent on the date of the encounter doing chart review, documentation and further activities as noted.     Provider location: [facility], [address] [place] [address] [address], [place] [address]    Patient location: patient home    Mode of transmission: video     Subjective:    Established patient. The following is a comprehensive summary of her Endocrine care to date.     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    7/30/2024: she stopped methimazole (it was well tolerated) around early 5/2024 because she felt well. Since stopping methimazole she's continued to feel well without any thyrotoxic symptoms. No symptoms suggestive of thyroid eye disease. No compressive symptoms. No recent GC exposure. Because of difficulty losing weight she'd like to be evaluated for cortisol excess and androgen excess. She has chronic hirsutism. No vocal deepening. No increase in muscle mass. She'd also like to have her FSH/E2 level assessed.     6/1/2024: TSH 1.08, total T3 low normal, free T4 low normal (TFTs stable compared to March)     3/2024: she stopped propranolol     1/18/2024: increase methimazole from 5 mg daily to 7.5 mg daily     12/15/2023: About 3 weeks ago she developed symptoms including palpitations, anxiety, irritability, hyperdefecation. She started propranolol 60 mg ER about 1 week ago and notes it's helping. No use of biotin. No symptoms to suggest LYNDSEY. Plan: We reviewed the pathophysiology and natural history of Graves' disease.  We reviewed treatment options in detail including antithyroid drug therapy, radioactive iodine ablation, and thyroidectomy.  Nessa has a preference for methimazole and I agree with this approach.  I prescribed methimazole 5 mg daily.    12/8/2023: TSH undetectable, free T4 mildly elevated, total T3 high normal, TSI 2.7 (ref range <=1.3 TSI index), TRAb 2.56 (ULN 1.75  international unit(s)/L)     12/2023: WBC normal, LFTs normal     9/13/2023: TSH 1.34        3/2022: TSI undetectable  4/2022: TPO Ab elevated at 94 (ULN 6 IU/mL), TRAb within the normal range     11/2021: she had several week of upper respiratory tract symptoms  COVID-19 positive 1/24/2022.   She developed symptoms around mid 2/2022 including palpitations, insomnia, anxiety, tremors, hyper defecation. She did not have any neck pain, swelling, tenderness.     The lab draw 3/2/2022 was the first time she was found to have thyroid dysfunction. She had never previously been on thyroid hormone therapy or ATD outside of brief use of methimazole in 4/2022 - see below. No prior thyroid surgeries. No prior H/N radiation. No prior JEREZ therapy.     3/18/2022: NM U/S, JEREZ uptake at 24 hours: 0.8%. Homogeneous isotope distribution in a gland which is difficult to see due to the low radiotracer uptake. I reviewed the images in detail and agree with the radiology read.     Methimazole taken 4/8/2022 - 4/19/2022 (stopped due to thyroiditis as etiology)    Thyroid US 4/19/2022: I reviewed the images and agree with the radiology read as below  -RIGHT lobe: 4.9 x 1.7 x 1.3 cm. Mildly heterogeneous echotexture without focal nodule. Color Doppler demonstrates hyperemia.  -Isthmus: 3 mm.  -LEFT lobe: 5.1 x 1.7 x 1.2 cm. Mildly heterogeneous echotexture without focal nodule. Color Doppler demonstrates mild hyperemia.  -NECK: No cervical lymphadenopathy.     She has no known history of thyroid nodules.     She does not take iodine, or eat seaweed or kelp. CTA chest 12/7/2023, per radiology - unremarkable.      No FH of thyroid disease.      No tobacco use. Rare alcohol use.    She has a known inflammatory polyarthritis. No recent GC exposure.    She had a hysterectomy (ovaries left in situ) and hot flashes started ~2021. She was not given estrogen.     PCP rx Saxenda 7/29/2024 for weight loss and placed a [facility].     Objective:    Video visit today. Appears well. No LYNDSEY. No obvious goiter.     4/2022: BMI 28.17 kg/meters squared.  Blood pressure 110/80.  She appears well.  Regular rate and rhythm.  She does have conjunctival inflammation and using an exophthalmometer I measured the right eye at 20 mm and left at 19 mm with upper limit of normal in a  woman being 19 mm.  Clinical activity score is at most 1.  The thyroid is not inflamed.  There is some fullness in the lower anterior right neck but no discrete thyroid nodularity.  No cervical lymphadenopathy.  Breathing comfortably on room air.  No significant lower extremity edema.    Assessment/Plan:    # Graves disease  # No thyroid eye disease       TFTs, TRAb ordered. Reviewed if labs confirm she is in remission then she can graduate from my clinic and in that case I'll give her and her PCP specific monitoring recommendations going forward.    # Hormone assessment    She'd like assessment of FSH, estradiol, androgens, and 24 hour urine cortisol - labs ordered.

## 2024-07-29 NOTE — PROGRESS NOTES
Assessment & Plan   Overweight  Management options discussed.  Saxenda prescribed and recommended to continue healthy diet.  Weight management referral placed.  Follow-up in 3 months  - liraglutide - Weight Management (SAXENDA) 18 MG/3ML pen; Inject 0.6 mg subcutaneously daily for 7 days, THEN 1.2 mg daily for 7 days, THEN 1.8 mg daily for 7 days, THEN 2.4 mg daily for 7 days, THEN 3 mg daily for 7 days.  - Adult Nutrition  Referral; Future    Visit for screening mammogram  Patient deferred screening mammogram    Chronic pain of right knee  Known to have chronic knee pain, meniscal tear last April.  Physical examination as described.  Orthopedic referral placed for further evaluation  - Orthopedic  Referral; Future    Inflammatory polyarthritis (H)  Following rheumatology.  Will continue hydroxychloroquine    I spent 30 minutes during this encounter, greater than 50% of the time was spent on education, counseling, reviewing the plan of care, and coordination in regards to her specific conditions.      David Szymanski is a 54 year old, presenting for the following health issues:  Musculoskeletal Problem and Weight Problem        7/29/2024    11:11 AM   Additional Questions   Roomed by Zoe DOSHI LPN   Accompanied by Self     History of Present Illness       Reason for visit:  Weight loss, knee pain, skin change on leg, FMLA paperwork    She eats 0-1 servings of fruits and vegetables daily.She consumes 1 sweetened beverage(s) daily.She exercises with enough effort to increase her heart rate 20 to 29 minutes per day.  She exercises with enough effort to increase her heart rate 4 days per week.   She is taking medications regularly.       Review of Systems  Constitutional, neuro, ENT, endocrine, pulmonary, cardiac, gastrointestinal, genitourinary, musculoskeletal, integument and psychiatric systems are negative, except as otherwise noted.      Objective    /78   Pulse 70   Temp 97.8  F  "(36.6  C) (Tympanic)   Resp 20   Ht 1.575 m (5' 2\")   Wt 70.6 kg (155 lb 9.6 oz)   LMP 09/28/2011   SpO2 97%   BMI 28.46 kg/m    Body mass index is 28.46 kg/m .  Physical Exam   GENERAL: alert and no distress  EYES: Eyes grossly normal to inspection, PERRL and conjunctivae and sclerae normal  NECK: no adenopathy, no asymmetry, masses, or scars  RESP: lungs clear to auscultation - no rales, rhonchi or wheezes  CV: regular rate and rhythm, normal S1 S2, no S3 or S4, no murmur, click or rub, no peripheral edema  ABDOMEN: soft, nontender  MS: Right knee moderately swollen, limited knee flexion, gait slightly antalgic, no pedal edema noted  NEURO: Normal strength and tone, mentation intact and speech normal  PSYCH: mentation appears normal, affect normal/bright      Wt Readings from Last 10 Encounters:   07/29/24 70.6 kg (155 lb 9.6 oz)   09/13/23 71.3 kg (157 lb 3.2 oz)   07/05/23 72.6 kg (160 lb)   06/15/23 72.6 kg (160 lb)   05/26/23 72.1 kg (159 lb)   05/18/23 72.1 kg (159 lb)   01/03/23 73 kg (161 lb)   10/14/22 73 kg (161 lb)   07/06/22 69.2 kg (152 lb 8 oz)   06/02/22 69.9 kg (154 lb)          Signed Electronically by: Dinesh Blake MD    "

## 2024-07-29 NOTE — TELEPHONE ENCOUNTER
General Call      Reason for Call:  medication change    What are your questions or concerns:  Pharmacy calling in regards to pt's Saxenda medication that was sent to them today by Dr. Blake. Need clarification on dosage. Call back number: [phone].      Okay to leave a detailed message?: Yes at Other phone number:  [phone]  [facility] [place], [place] - [facility] [address]

## 2024-07-30 ENCOUNTER — VIRTUAL VISIT (OUTPATIENT)
Dept: ENDOCRINOLOGY | Facility: CLINIC | Age: 54
End: 2024-07-30
Payer: COMMERCIAL

## 2024-07-30 DIAGNOSIS — R63.5 WEIGHT GAIN: ICD-10-CM

## 2024-07-30 DIAGNOSIS — L68.0 HIRSUTISM: ICD-10-CM

## 2024-07-30 DIAGNOSIS — E05.00 GRAVES DISEASE: ICD-10-CM

## 2024-07-30 DIAGNOSIS — E66.3 OVERWEIGHT (BMI 25.0-29.9): ICD-10-CM

## 2024-07-30 PROCEDURE — 99213 OFFICE O/P EST LOW 20 MIN: CPT | Mod: 95 | Performed by: INTERNAL MEDICINE

## 2024-07-30 NOTE — LETTER
7/30/2024      Nessa Briceño  [address] [address]      Dear Colleague,    Thank you for referring your patient, Nessa Briceño, to the [facility]. Please see a copy of my visit note below.    Video visit    Start time 9:41, stop time 9:55; additional 10 minutes spent on the date of the encounter doing chart review, documentation and further activities as noted.     Provider location[facility], [address] [place] [address] [address], [place] [address]    Patient location: patient home    Mode of transmission: video     Subjective:    Established patient. The following is a comprehensive summary of her Endocrine care to date.     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    7/30/2024: she stopped methimazole (it was well tolerated) around early 5/2024 because she felt well. Since stopping methimazole she's continued to feel well without any thyrotoxic symptoms. No symptoms suggestive of thyroid eye disease. No compressive symptoms. No recent GC exposure. Because of difficulty losing weight she'd like to be evaluated for cortisol excess and androgen excess. She has chronic hirsutism. No vocal deepening. No increase in muscle mass. She'd also like to have her FSH/E2 level assessed.     6/1/2024: TSH 1.08, total T3 low normal, free T4 low normal (TFTs stable compared to March)     3/2024: she stopped propranolol     1/18/2024: increase methimazole from 5 mg daily to 7.5 mg daily     12/15/2023: About 3 weeks ago she developed symptoms including palpitations, anxiety, irritability, hyperdefecation. She started propranolol 60 mg ER about 1 week ago and notes it's helping. No use of biotin. No symptoms to suggest LYNDSEY. Plan: We reviewed the pathophysiology and natural history of Graves' disease.  We reviewed treatment options in detail including antithyroid drug therapy, radioactive iodine ablation, and thyroidectomy.  Nessa has a  preference for methimazole and I agree with this approach.  I prescribed methimazole 5 mg daily.    12/8/2023: TSH undetectable, free T4 mildly elevated, total T3 high normal, TSI 2.7 (ref range <=1.3 TSI index), TRAb 2.56 (ULN 1.75 international unit(s)/L)     12/2023: WBC normal, LFTs normal     9/13/2023: TSH 1.34        3/2022: TSI undetectable  4/2022: TPO Ab elevated at 94 (ULN 6 IU/mL), TRAb within the normal range     11/2021: she had several week of upper respiratory tract symptoms  COVID-19 positive 1/24/2022.   She developed symptoms around mid 2/2022 including palpitations, insomnia, anxiety, tremors, hyper defecation. She did not have any neck pain, swelling, tenderness.     The lab draw 3/2/2022 was the first time she was found to have thyroid dysfunction. She had never previously been on thyroid hormone therapy or ATD outside of brief use of methimazole in 4/2022 - see below. No prior thyroid surgeries. No prior H/N radiation. No prior JEREZ therapy.     3/18/2022: NM U/S, JEREZ uptake at 24 hours: 0.8%. Homogeneous isotope distribution in a gland which is difficult to see due to the low radiotracer uptake. I reviewed the images in detail and agree with the radiology read.     Methimazole taken 4/8/2022 - 4/19/2022 (stopped due to thyroiditis as etiology)    Thyroid US 4/19/2022: I reviewed the images and agree with the radiology read as below  -RIGHT lobe: 4.9 x 1.7 x 1.3 cm. Mildly heterogeneous echotexture without focal nodule. Color Doppler demonstrates hyperemia.  -Isthmus: 3 mm.  -LEFT lobe: 5.1 x 1.7 x 1.2 cm. Mildly heterogeneous echotexture without focal nodule. Color Doppler demonstrates mild hyperemia.  -NECK: No cervical lymphadenopathy.     She has no known history of thyroid nodules.     She does not take iodine, or eat seaweed or kelp. CTA chest 12/7/2023, per radiology - unremarkable.      No FH of thyroid disease.      No tobacco use. Rare alcohol use.    She has a known inflammatory  polyarthritis. No recent GC exposure.    She had a hysterectomy (ovaries left in situ) and hot flashes started ~2021. She was not given estrogen.     PCP rx Dina 7/29/2024 for weight loss and [facility].     Objective:    Video visit today. Appears well. No LYNDSEY. No obvious goiter.     4/2022: BMI 28.17 kg/meters squared.  Blood pressure 110/80.  She appears well.  Regular rate and rhythm.  She does have conjunctival inflammation and using an exophthalmometer I measured the right eye at 20 mm and left at 19 mm with upper limit of normal in a  woman being 19 mm.  Clinical activity score is at most 1.  The thyroid is not inflamed.  There is some fullness in the lower anterior right neck but no discrete thyroid nodularity.  No cervical lymphadenopathy.  Breathing comfortably on room air.  No significant lower extremity edema.    Assessment/Plan:    # Graves disease  # No thyroid eye disease       TFTs, TRAb ordered. Reviewed if labs confirm she is in remission then she can graduate from my clinic and in that case I'll give her and her PCP specific monitoring recommendations going forward.    # Hormone assessment    She'd like assessment of FSH, estradiol, androgens, and 24 hour urine cortisol - labs ordered.      Again, thank you for allowing me to participate in the care of your patient.        Sincerely,        Jean-Claude Ragsdale MD

## 2024-07-31 ENCOUNTER — TELEPHONE (OUTPATIENT)
Dept: FAMILY MEDICINE | Facility: CLINIC | Age: 54
End: 2024-07-31
Payer: COMMERCIAL

## 2024-07-31 RX ORDER — LIRAGLUTIDE 6 MG/ML
INJECTION, SOLUTION SUBCUTANEOUS
Qty: 15 ML | Refills: 0 | Status: SHIPPED | OUTPATIENT
Start: 2024-07-31 | End: 2024-09-11

## 2024-07-31 NOTE — TELEPHONE ENCOUNTER
Spoke with Pharmacy they stated they need a New script for 15 mL, they will not be able to fill for 10.5mL and needs prior authorization in order to fill.    Pharmacy states this is the number they received to complete the [phone]  Prior Auth.     Please Advise. Pended Script for 15ml for consideration.  Andria Maldonado, RN on 7/31/2024 at 3:27 PM

## 2024-07-31 NOTE — TELEPHONE ENCOUNTER
Prior Authorization Retail Medication Request    Medication/Dose: Saxenda 18mg/3ml  Diagnosis and ICD code (if different than what is on RX):    New/renewal/insurance change PA/secondary ins. PA:  Previously Tried and Failed:    Rationale:      Covermymeds  Key: [id number]    Pharmacy Information (if different than what is on RX)  Name:    Phone:    Fax:

## 2024-08-01 NOTE — TELEPHONE ENCOUNTER
Central Prior Authorization Team   Phone: [phone]    PA Initiation    Medication: Saxenda 18mg/3ml  Insurance Company: [employer] - Phone [phone] Fax [phone]  Pharmacy Filling the Rx: [facility] - [place], [place] - [address]  Filling Pharmacy Phone: [phone]  Filling Pharmacy Fax:    Start Date: 8/1/2024

## 2024-08-02 NOTE — TELEPHONE ENCOUNTER
Prior Authorization Approval    Authorization Effective Date: 8/1/2024  Authorization Expiration Date: 12/1/2024  Medication: Saxenda 18mg/3ml  Reference #:     Insurance Company: [employer] - Phone [phone] Fax [phone]  Which Pharmacy is filling the prescription (Not needed for infusion/clinic administered): [facility] [place], [place] - [facility] [address] [facility]  Pharmacy Notified: Yes  Patient Notified: Instructed pharmacy to notify patient when script is ready to /ship.

## 2024-08-06 ENCOUNTER — LAB (OUTPATIENT)
Dept: LAB | Facility: CLINIC | Age: 54
End: 2024-08-06
Payer: COMMERCIAL

## 2024-08-06 DIAGNOSIS — E05.00 GRAVES DISEASE: ICD-10-CM

## 2024-08-06 DIAGNOSIS — R63.5 WEIGHT GAIN: ICD-10-CM

## 2024-08-06 DIAGNOSIS — E66.3 OVERWEIGHT (BMI 25.0-29.9): ICD-10-CM

## 2024-08-06 DIAGNOSIS — L68.0 HIRSUTISM: ICD-10-CM

## 2024-08-06 LAB
T4 FREE SERPL-MCNC: 1.11 NG/DL (ref 0.9–1.7)
TSH SERPL DL<=0.005 MIU/L-ACNC: 1.29 UIU/ML (ref 0.3–4.2)

## 2024-08-06 PROCEDURE — 36415 COLL VENOUS BLD VENIPUNCTURE: CPT

## 2024-08-06 PROCEDURE — 81050 URINALYSIS VOLUME MEASURE: CPT

## 2024-08-06 PROCEDURE — 84480 ASSAY TRIIODOTHYRONINE (T3): CPT

## 2024-08-06 PROCEDURE — 82627 DEHYDROEPIANDROSTERONE: CPT

## 2024-08-06 PROCEDURE — 82570 ASSAY OF URINE CREATININE: CPT

## 2024-08-06 PROCEDURE — 82530 CORTISOL FREE: CPT | Mod: 90

## 2024-08-06 PROCEDURE — 99000 SPECIMEN HANDLING OFFICE-LAB: CPT

## 2024-08-06 PROCEDURE — 84270 ASSAY OF SEX HORMONE GLOBUL: CPT

## 2024-08-06 PROCEDURE — 82157 ASSAY OF ANDROSTENEDIONE: CPT | Mod: 90

## 2024-08-06 PROCEDURE — 84439 ASSAY OF FREE THYROXINE: CPT

## 2024-08-06 PROCEDURE — 83520 IMMUNOASSAY QUANT NOS NONAB: CPT | Mod: 90

## 2024-08-06 PROCEDURE — 84443 ASSAY THYROID STIM HORMONE: CPT

## 2024-08-06 PROCEDURE — 83001 ASSAY OF GONADOTROPIN (FSH): CPT

## 2024-08-06 PROCEDURE — 82670 ASSAY OF TOTAL ESTRADIOL: CPT

## 2024-08-06 PROCEDURE — 84403 ASSAY OF TOTAL TESTOSTERONE: CPT

## 2024-08-07 LAB
DHEA-S SERPL-MCNC: 44 UG/DL (ref 35–430)
FSH SERPL IRP2-ACNC: 31.2 MIU/ML
SHBG SERPL-SCNC: 53 NMOL/L (ref 30–135)
T3 SERPL-MCNC: 129 NG/DL (ref 85–202)
TSH RECEP AB SER-ACNC: 1.26 IU/L (ref 0–1.75)

## 2024-08-08 ENCOUNTER — OFFICE VISIT (OUTPATIENT)
Dept: ORTHOPEDICS | Facility: CLINIC | Age: 54
End: 2024-08-08
Attending: FAMILY MEDICINE
Payer: COMMERCIAL

## 2024-08-08 ENCOUNTER — TELEPHONE (OUTPATIENT)
Dept: ORTHOPEDICS | Facility: CLINIC | Age: 54
End: 2024-08-08

## 2024-08-08 ENCOUNTER — ANCILLARY PROCEDURE (OUTPATIENT)
Dept: GENERAL RADIOLOGY | Facility: CLINIC | Age: 54
End: 2024-08-08
Attending: PEDIATRICS
Payer: COMMERCIAL

## 2024-08-08 DIAGNOSIS — S83.241D TEAR OF MEDIAL MENISCUS OF RIGHT KNEE, UNSPECIFIED TEAR TYPE, UNSPECIFIED WHETHER OLD OR CURRENT TEAR, SUBSEQUENT ENCOUNTER: ICD-10-CM

## 2024-08-08 DIAGNOSIS — Z98.890 S/P KNEE SURGERY: ICD-10-CM

## 2024-08-08 DIAGNOSIS — G89.29 CHRONIC PAIN OF RIGHT KNEE: ICD-10-CM

## 2024-08-08 DIAGNOSIS — M25.561 CHRONIC PAIN OF RIGHT KNEE: Primary | ICD-10-CM

## 2024-08-08 DIAGNOSIS — G89.29 CHRONIC PAIN OF RIGHT KNEE: Primary | ICD-10-CM

## 2024-08-08 DIAGNOSIS — M17.11 ARTHRITIS OF RIGHT KNEE: ICD-10-CM

## 2024-08-08 DIAGNOSIS — M17.11 ARTHRITIS OF RIGHT KNEE: Primary | ICD-10-CM

## 2024-08-08 DIAGNOSIS — M23.203 DEGENERATIVE TEAR OF MEDIAL MENISCUS OF RIGHT KNEE: ICD-10-CM

## 2024-08-08 DIAGNOSIS — M25.561 CHRONIC PAIN OF RIGHT KNEE: ICD-10-CM

## 2024-08-08 LAB
COLLECT DURATION TIME UR: 24 H
CREAT 24H UR-MRATE: 0.84 G/SPEC (ref 0.72–1.51)
CREAT UR-MCNC: 104.7 MG/DL
SPECIMEN VOL UR: 800 ML
TESTOST FREE SERPL-MCNC: 0.2 NG/DL
TESTOST SERPL-MCNC: 15 NG/DL (ref 8–60)

## 2024-08-08 PROCEDURE — 99214 OFFICE O/P EST MOD 30 MIN: CPT | Performed by: PEDIATRICS

## 2024-08-08 PROCEDURE — 73562 X-RAY EXAM OF KNEE 3: CPT | Mod: TC | Performed by: STUDENT IN AN ORGANIZED HEALTH CARE EDUCATION/TRAINING PROGRAM

## 2024-08-08 PROCEDURE — G2211 COMPLEX E/M VISIT ADD ON: HCPCS | Performed by: PEDIATRICS

## 2024-08-08 NOTE — LETTER
8/8/2024      Nessa Briceño  [address] [facility] [address]      Dear Colleague,    Thank you for referring your patient, Nessa Briceño, to the [facility]. Please see a copy of my visit note below.    ASSESSMENT & PLAN    Nessa was seen today for follow up.    Diagnoses and all orders for this visit:    Arthritis of right knee    Chronic pain of right knee  -     Orthopedic  Referral  -     XR Knee Standing AP Bilat [place] Bilat Lat Right; Future    Tear of medial meniscus of right knee, unspecified tear type, unspecified whether old or current tear, subsequent encounter      This issue is chronic and Unchanged.      ICD-10-CM    1. Arthritis of right knee  M17.11       2. Chronic pain of right knee  M25.561 Orthopedic  Referral    G89.29 XR Knee Standing AP Bilat [place] Bilat Lat Right      3. 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     We discussed the following treatment options: symptom treatment, activity modification/rest, imaging, rehab and referral. Following discussion, plan: patient would like to return to physical therapy.    Plan:  - Today's Plan of Care:  Referral to Physical Therapy - needs to come from PCP for insurance purposes  Discussed activity considerations and other supportive care including Ice/Heat, OTC and other topical medications as needed.    -We also discussed other future treatment options:  Consideration of US guided Hyaluronic Acid Injection (call first, requires insurance approval)   Referral to Orthopedic Surgery  Consideration of corticosteroid injection    Follow Up: 6 - 8 weeks and as needed    Concerning signs and symptoms were reviewed and all questions were answered at this  time.    Cheyanne Erazo MD [facility]  Sports Medicine Physician  [facility]    SUBJECTIVE- Interim History August 8, 2024    Chief Complaint   Patient presents with     Right Knee - Follow Up       Nessa Briceño is a 54 year old female who is seen in f/u up for right knee. Since last visit on 7/5/2023 patient has worsening pain. She reports that she did see Dr. Jose Carlos Delgado at [facility] last year after being referred, and he recommended physical therapy. She reports that she completed physical therapy last summer and initially had some relief, but noticed worsening pain in February/March 2024 with no new injury.  - Now ~ 16 months from initial onset (Mid-April 2023)    Location of pain: left medial knee, achy pain constant, intermittent   Worsened by: rising from sitting, initiating walking, stairs, worse at end of day  Better with: rest, neoprene sleeve  Treatments tried: physical therapy (12+ visits), ice, heat, ibuprofen, Tylenol, neoprene sleeve  - prior corticosteroid injection with me 5/26/2023 was not helpful  Associated symptoms:  weakness of right knee, swelling, clicking/catching, instability    The patient is seen by themselves.    Orthopedic/Surgical history: Yes, 1978, fracture with surgery, 1991, surgery to remove OCDs   Social History/Occupation: Nurse      REVIEW OF SYSTEMS:  Review of Systems    OBJECTIVE:  [facility] 09/28/2011    General: healthy, alert and in no distress  Skin: no suspicious lesions or rash.  CV: distal perfusion intact  Resp: normal respiratory effort without conversational dyspnea   Psych: normal mood and affect  Gait: normal  Neuro: Normal light sensory exam of lower extremity     Bilateral Knee exam  Inspection:      mild swelling right, no effusion, well healed medial knee scar     Patella:      Mobility -       hypomobile right       Crepitus noted in the patellofemoral joint right     Tender:      medial patellar border right       medial joint line right     Non  Tender:      remainder of knee area bilateral     Knee ROM:      Range of motion limited on the right in full flexion and extension     Hip ROM:     Full active and passive ROM bilateral     Strength:      5-/5 with knee extension right     Special Tests:     positive (+) Byron right       neg (-) anterior drawer right       neg (-) posterior drawer right       neg (-) varus at 0 deg and 30 deg right       neg (-) valgus at 0 deg and 30 deg right     Gait:      normal     Neurovascular:      2+ peripheral pulses bilaterally and brisk capillary refill       sensation grossly intact    RADIOLOGY:  Final results and radiologist's interpretation, available in the [facility] health record.  Images were reviewed with the patient in the office today.  My personal interpretation of the performed imaging:     AP and sunrise bilateral and right lateral XR views of knees reviewed: no acute bony abnormality, mild - medial and PF joint degenerative changes  - will follow official read    MRI Right knee 5/21/2023 - medial meniscal tearing posterior horn with mild extrusion and cartilage thinning     Review of the result(s) of each unique test - XR  and MRI             Again, thank you for allowing me to participate in the care of your patient.        Sincerely,        Cheyanne Erazo MD

## 2024-08-08 NOTE — TELEPHONE ENCOUNTER
Patient was seen today for right knee pain, arthritis and meniscal tearing.  Would like PT referral. Per her insurance this needs to come from primary care.    Please forward PT referral to PCP Dr. Delgadillo or Dr. Blake for signature if they are willing.    Please fax to PT in [place] if needed - can reach out to patient to discuss.    Cheyanne Erazo MD

## 2024-08-08 NOTE — PROGRESS NOTES
ASSESSMENT & PLAN    Nessa was seen today for follow up.    Diagnoses and all orders for this visit:    Arthritis of right knee    Chronic pain of right knee  -     Orthopedic  Referral  -     XR Knee Standing AP Bilat [place] Bilat Lat Right; Future    Tear of medial meniscus of right knee, unspecified tear type, unspecified whether old or current tear, subsequent encounter      This issue is chronic and Unchanged.      ICD-10-CM    1. Arthritis of right knee  M17.11       2. Chronic pain of right knee  M25.561 Orthopedic  Referral    G89.29 XR Knee Standing AP Bilat [place] Bilat Lat Right      3. 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     We discussed the following treatment options: symptom treatment, activity modification/rest, imaging, rehab and referral. Following discussion, plan: patient would like to return to physical therapy.    Plan:  - Today's Plan of Care:  Referral to Physical Therapy - needs to come from PCP for insurance purposes  Discussed activity considerations and other supportive care including Ice/Heat, OTC and other topical medications as needed.    -We also discussed other future treatment options:  Consideration of US guided Hyaluronic Acid Injection (call first, requires insurance approval)   Referral to Orthopedic Surgery  Consideration of corticosteroid injection    Follow Up: 6 - 8 weeks and as needed    Concerning signs and symptoms were reviewed and all questions were answered at this time.    Cheyanne Erazo MD [facility]  Sports Medicine Physician  [facility]    SUBJECTIVE- Interim History August 8, 2024    Chief Complaint   Patient presents with    Right Knee - Follow Up       Nessa Briceño is a 54 year old  female who is seen in f/u up for right knee. Since last visit on 7/5/2023 patient has worsening pain. She reports that she did see Dr. Jose Carlos Delgado at [facility] last year after being referred, and he recommended physical therapy. She reports that she completed physical therapy last summer and initially had some relief, but noticed worsening pain in February/March 2024 with no new injury.  - Now ~ 16 months from initial onset (Mid-April 2023)    Location of pain: left medial knee, achy pain constant, intermittent   Worsened by: rising from sitting, initiating walking, stairs, worse at end of day  Better with: rest, neoprene sleeve  Treatments tried: physical therapy (12+ visits), ice, heat, ibuprofen, Tylenol, neoprene sleeve  - prior corticosteroid injection with me 5/26/2023 was not helpful  Associated symptoms:  weakness of right knee, swelling, clicking/catching, instability    The patient is seen by themselves.    Orthopedic/Surgical history: Yes, 1978, fracture with surgery, 1991, surgery to remove OCDs   Social History/Occupation: Nurse      REVIEW OF SYSTEMS:  Review of Systems    OBJECTIVE:  [facility] 09/28/2011    General: healthy, alert and in no distress  Skin: no suspicious lesions or rash.  CV: distal perfusion intact  Resp: normal respiratory effort without conversational dyspnea   Psych: normal mood and affect  Gait: normal  Neuro: Normal light sensory exam of lower extremity     Bilateral Knee exam  Inspection:      mild swelling right, no effusion, well healed medial knee scar     Patella:      Mobility -       hypomobile right       Crepitus noted in the patellofemoral joint right     Tender:      medial patellar border right       medial joint line right     Non Tender:      remainder of knee area bilateral     Knee ROM:      Range of motion limited on the right in full flexion and extension     Hip ROM:     Full active and passive ROM bilateral     Strength:      5-/5 with knee extension right     Special  Tests:     positive (+) Byron right       neg (-) anterior drawer right       neg (-) posterior drawer right       neg (-) varus at 0 deg and 30 deg right       neg (-) valgus at 0 deg and 30 deg right     Gait:      normal     Neurovascular:      2+ peripheral pulses bilaterally and brisk capillary refill       sensation grossly intact    RADIOLOGY:  Final results and radiologist's interpretation, available in the [facility] health record.  Images were reviewed with the patient in the office today.  My personal interpretation of the performed imaging:     AP and sunrise bilateral and right lateral XR views of knees reviewed: no acute bony abnormality, mild - medial and PF joint degenerative changes  - will follow official read    MRI Right knee 5/21/2023 - medial meniscal tearing posterior horn with mild extrusion and cartilage thinning     Review of the result(s) of each unique test - XR  and MRI

## 2024-08-08 NOTE — PATIENT INSTRUCTIONS
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     Plan:  - Today's Plan of Care:  Referral to Physical Therapy - needs to come from PCP for insurance purposes  Discussed activity considerations and other supportive care including Ice/Heat, OTC and other topical medications as needed.    -We also discussed other future treatment options:  Consideration of US guided Hyaluronic Acid Injection (call first, requires insurance approval)   Referral to Orthopedic Surgery  Consideration of corticosteroid injection    Follow Up: 6 - 8 weeks and as needed    If you have any further questions for your physician or physician s care team you can call [phone].

## 2024-08-12 LAB — ANDROST SERPL-MCNC: 0.63 NG/ML

## 2024-08-13 LAB — ESTRADIOL SERPL HS-MCNC: 74 PG/ML

## 2024-08-14 ENCOUNTER — PATIENT OUTREACH (OUTPATIENT)
Dept: CARE COORDINATION | Facility: CLINIC | Age: 54
End: 2024-08-14
Payer: COMMERCIAL

## 2024-08-14 LAB
ANNOTATION COMMENT IMP: NORMAL
CORTIS F 24H UR HPLC-MCNC: 19.2 UG/L
CORTIS F 24H UR-MRATE: 15.4 UG/D
CORTIS F/CREAT 24H UR: 17.3 UG/G CRT
CREAT 24H UR-MRATE: 888 MG/D
CREAT UR-MCNC: 111 MG/DL

## 2024-08-28 ENCOUNTER — PATIENT OUTREACH (OUTPATIENT)
Dept: CARE COORDINATION | Facility: CLINIC | Age: 54
End: 2024-08-28
Payer: COMMERCIAL

## 2024-08-31 ENCOUNTER — PATIENT OUTREACH (OUTPATIENT)
Dept: CARE COORDINATION | Facility: CLINIC | Age: 54
End: 2024-08-31
Payer: COMMERCIAL

## 2024-09-03 ENCOUNTER — HOSPITAL ENCOUNTER (OUTPATIENT)
Dept: NUTRITION | Facility: CLINIC | Age: 54
Discharge: HOME | End: 2024-09-03
Attending: FAMILY MEDICINE | Admitting: FAMILY MEDICINE
Payer: COMMERCIAL

## 2024-09-03 DIAGNOSIS — E66.3 OVERWEIGHT: ICD-10-CM

## 2024-09-03 PROCEDURE — 97802 MEDICAL NUTRITION INDIV IN: CPT | Mod: TEL,95

## 2024-09-03 NOTE — PROGRESS NOTES
Virtual Visit Details    Type of service:  Telephone Visit   Phone call duration: 70 minutes   Originating Location (pt. Location): Home    Distant Location (provider location):  On-site  OUTPATIENT CLINICAL NUTRITION SERVICES ASSESSMENT    REASON FOR ASSESSMENT  Nessa Bricñeo referred by Dr. Blake, Dinesh Leal MD    for MNT related to E66.3 (ICD-10-CM) - Overweight .      Patient accompanied by herself.      ASSESSMENT   Nutrition History:  - Information obtained from patient chart and patient interview.    -PMH:  Patient Active Problem List   Diagnosis    Hypoglycemia    Dysmenorrhea    Neck pain    Ganglion, joint    Hip pain    Dysfunctional uterine bleeding    GERD (gastroesophageal reflux disease)    CARDIOVASCULAR SCREENING; LDL GOAL LESS THAN 160    Menorrhagia    Pain in joint    Mild intermittent asthma without complication    Low TSH level    Inflammatory polyarthritis (H)    Injury of right knee, initial encounter    Acute pain of right knee    Pedal edema       Diet Recall:  Breakfast: 1 cup of coffee;   Lunch: 1 pm on days off (salad mix), or 2-2:30 pm;  2 pieces of homemade bread, lettuce cheese and meat and lomas.   Dinner: [place]; hot dog.   Snack: None/rarely-may use snap peas, or cucumbers;  Beverages: water (60 ounces)-sometimes 1-3  Dining out: 1 x every 2-3 weeks         Nutritional Details:   -Food allergies: NKFA  -Food sensitivities: No dairy  -GI concerns: bloating  -Appetite: Not much of an appetite; queasy in morning  -Pace of eating: normal  -Role of cooking: Patient and   -Role of food shopping: Patient and     Physical Activity:  Days per week: 4,000-6,000; 8,000-11,000 (ranges)  Activity type: walking   Limitations: Knee pain with anything walking stairs.     NUTRITION FOCUSED PHYSICAL ASSESSMENT (NFPA) FOR DIAGNOSING MALNUTRITION  Yes  No:  Telephone visit only         Observed:   Unable to assess; telephone visit.    LABS  Labs reviewed  Lab on 08/06/2024  "  Component Date Value Ref Range Status    Free T4 08/06/2024 1.11  0.90 - 1.70 ng/dL Final    T3 Total 08/06/2024 129  85 - 202 ng/dL Final    TSH 08/06/2024 1.29  0.30 - 4.20 uIU/mL Final    Thyrotropin Receptor Antibody 08/06/2024 1.26  0.00 - 1.75 IU/L Final       MEDICATIONS  Medications reviewed  Current Outpatient Medications   Medication Sig Dispense Refill    albuterol (PROAIR HFA/PROVENTIL HFA/VENTOLIN HFA) 108 (90 Base) MCG/ACT inhaler Inhale 2 puffs into the lungs every 6 hours as needed for shortness of breath / dyspnea or wheezing 18 g 1    hydrochlorothiazide (HYDRODIURIL) 25 MG tablet Take 1 tablet (25 mg) by mouth as needed (edma) 90 tablet 3    hydroxychloroquine (PLAQUENIL) 200 MG tablet Take 1 tablet (200 mg) by mouth daily 90 tablet 1    liraglutide - Weight Management (SAXENDA) 18 MG/3ML pen Inject 0.6 mg subcutaneously daily for 7 days, THEN 1.2 mg daily for 7 days, THEN 1.8 mg daily for 7 days, THEN 2.4 mg daily for 7 days, THEN 3 mg daily for 7 days. 15 mL 0    methimazole (TAPAZOLE) 5 MG tablet 7.5 mg daily (Patient not taking: Reported on 7/30/2024) 60 tablet 0     No current facility-administered medications for this encounter.       ANTHROPOMETRICS   Height: 5'2\"  Weight: 70.6 kg  BMI (kg/m2): 28.3  Weight Status:  Overweight BMI 25-29.9  IBW: 50 kg  %IBW: 141%  Weight History:   Wt Readings from Last 15 Encounters:   07/29/24 70.6 kg (155 lb 9.6 oz)   09/13/23 71.3 kg (157 lb 3.2 oz)   07/05/23 72.6 kg (160 lb)   06/15/23 72.6 kg (160 lb)   05/26/23 72.1 kg (159 lb)   05/18/23 72.1 kg (159 lb)   01/03/23 73 kg (161 lb)   10/14/22 73 kg (161 lb)   07/06/22 69.2 kg (152 lb 8 oz)   06/02/22 69.9 kg (154 lb)   04/19/22 69.9 kg (154 lb)   03/09/22 68.5 kg (151 lb)   03/02/22 68.5 kg (151 lb)   09/01/20 67.1 kg (148 lb)   10/15/19 63.4 kg (139 lb 12.8 oz)     Dosing weight: 55.2 kg-adj BW used.    ASSESSED NUTRITION NEEDS  Estimated Energy Needs: 1,104-1,380 kcals/day (20-25 " "Kcal/Kg)  Justification:  (overweight)  Estimated Protein Needs: 55-66 grams protein/day (1-1.2 g pro/Kg)  Justification:  (preservation of lean body mass)  Estimated Fluid Needs: 1,104-1,380 mL/day (1 mL/Kcal)    ASSESSED MALNUTRITION STATUS  % Weight Loss:  None noted  % Intake:  No decreased intake noted  Subcutaneous Fat Loss:  Unable to assess  Loss of Muscle Mass: unable to assess  Fluid Retention:  None noted    Malnutrition Diagnosis:  Patient does not meet two of the above criteria necessary for diagnosing malnutrition    DIAGNOSIS   Nutrition Diagnosis:  Excessive energy intake related to inappropriate oral intake as evidenced by BMI of 28.3.      INTERVENTIONS   Nutrition Prescription weight management MNT      IMPLEMENTATION   Assessed learning needs and learning preference:   Teaching Method(s) used: Literature  Explanation    Nutrition Education (Content):              a)  Discussed Myplate, general healthy diet guidelines, weight management MNT.              b)  Provided the following handouts: General, Healthful Nutrition Therapy, MyPlate Guide, Weight Loss Tips, and Hunger- Fullness Cues    Nutrition Education (Application):              a)  Discussed current eating plans / recommended alternative food choices              b)  Patient verbalizes understanding of diet by identifying 1-2 meal ideas following the Myplate composition.      Anticipate good compliance   Stage of Change:  contemplation  Additional:     GOALS  1) Patient will implement of 1 lean protein with a fruit or vegetable prior the main meal of the day I.e mid morning   2) Patient will aim for 60 ounce of water intake at work.    FOLLOW UP/MONITORING   Progress towards goals will be monitored and evaluated per protocol and Practice Guidelines    Time Spent with Patient  60 minutes    Cheyanne De Los Santos RDN, LD  Clinical Dietitian  Office: [phone]  Hours: M-F 8-3pm   Weekend/Holiday HERMELINDO Byers - \"Weekend Clinical Dietitian\" (No longer " using pager)

## 2024-09-09 DIAGNOSIS — E66.3 OVERWEIGHT: ICD-10-CM

## 2024-09-10 ENCOUNTER — MYC MEDICAL ADVICE (OUTPATIENT)
Dept: FAMILY MEDICINE | Facility: CLINIC | Age: 54
End: 2024-09-10
Payer: COMMERCIAL

## 2024-09-12 ENCOUNTER — TELEPHONE (OUTPATIENT)
Dept: FAMILY MEDICINE | Facility: CLINIC | Age: 54
End: 2024-09-12
Payer: COMMERCIAL

## 2024-09-12 NOTE — TELEPHONE ENCOUNTER
Central Prior Authorization Team  Phone: [phone]    PRIOR AUTHORIZATION DENIED    Medication: SAXENDA 18 MG/3ML SC SOPN  Insurance Company:    Denial Date: 9/12/2024  Denial Reason(s):         Appeal Information:         Patient Notified: NO- Unfortunately, we cannot call the patient with denials because we do not know what next steps the MD will take nor can we give medical advice, please notify the patient of what they are to expect for the continuation of their therapy from the provider.

## 2024-10-29 ENCOUNTER — VIRTUAL VISIT (OUTPATIENT)
Dept: FAMILY MEDICINE | Facility: CLINIC | Age: 54
End: 2024-10-29
Payer: COMMERCIAL

## 2024-10-29 DIAGNOSIS — B00.1 COLD SORE: Primary | ICD-10-CM

## 2024-10-29 DIAGNOSIS — E66.3 OVERWEIGHT: ICD-10-CM

## 2024-10-29 PROCEDURE — G2211 COMPLEX E/M VISIT ADD ON: HCPCS | Mod: 95 | Performed by: FAMILY MEDICINE

## 2024-10-29 PROCEDURE — 99213 OFFICE O/P EST LOW 20 MIN: CPT | Mod: 95 | Performed by: FAMILY MEDICINE

## 2024-10-29 RX ORDER — VALACYCLOVIR HYDROCHLORIDE 1 G/1
2000 TABLET, FILM COATED ORAL 2 TIMES DAILY
Qty: 4 TABLET | Refills: 5 | Status: SHIPPED | OUTPATIENT
Start: 2024-10-29 | End: 2024-10-30

## 2024-10-29 NOTE — PROGRESS NOTES
"Nessa is a 54 year old who is being evaluated via a billable video visit.    How would you like to obtain your AVS? MyChart  If the video visit is dropped, the invitation should be resent by: Text to cell phone: [phone]  Will anyone else be joining your video visit? No      Assessment & Plan     Overweight  Patient lost about 17 pounds since started Saxenda, feeling much better with weight loss.  Recommended to continue Saxenda, regular exercise and healthy diet    Cold sore  Valtrex prescribed  - valACYclovir (VALTREX) 1000 mg tablet; Take 2 tablets (2,000 mg) by mouth 2 times daily for 1 day.      BMI  Estimated body mass index is 28.46 kg/m  as calculated from the following:    Height as of 7/29/24: 1.575 m (5' 2\").    Weight as of 7/29/24: 70.6 kg (155 lb 9.6 oz).   Weight management plan: Discussed healthy diet and exercise guidelines      Subjective   Nessa is a 54 year old, presenting for the following health issues:  Recheck Medication        10/29/2024    10:41 AM   Additional Questions   Roomed by Zoe MARINA LPN   Accompanied by self          Medication Followup of Saxenda  Taking Medication as prescribed: yes  Side Effects:  None- slight constipation  Medication Helping Symptoms:  yes  Current weight 140.4 pounds, weight was 156.8 pounds 3 months ago      Review of Systems  Constitutional, HEENT, cardiovascular, pulmonary, gi and gu systems are negative, except as otherwise noted.      Objective         Vitals:  No vitals were obtained today due to virtual visit.    Physical Exam   GENERAL: alert and no distress  EYES: Eyes grossly normal to inspection.  No discharge or erythema, or obvious scleral/conjunctival abnormalities.  RESP: No audible wheeze, cough, or visible cyanosis.    SKIN: Visible skin clear. No significant rash, abnormal pigmentation or lesions.  NEURO: Cranial nerves grossly intact.  Mentation and speech appropriate for age.  PSYCH: Appropriate affect, tone, and pace of " words      Video-Visit Details    Type of service:  Video Visit   Originating Location (pt. Location): Home    Distant Location (provider location):  On-site  Platform used for Video Visit: Alvaro  Signed Electronically by: Dinesh Blake MD

## 2024-11-06 ENCOUNTER — VIRTUAL VISIT (OUTPATIENT)
Dept: URGENT CARE | Facility: CLINIC | Age: 54
End: 2024-11-06
Payer: COMMERCIAL

## 2024-11-06 DIAGNOSIS — J06.9 VIRAL URI: Primary | ICD-10-CM

## 2024-11-06 PROCEDURE — 99213 OFFICE O/P EST LOW 20 MIN: CPT | Mod: 95

## 2024-11-06 NOTE — LETTER
November 6, 2024      Nessa Briceño  [address] [place] [id number]        To Whom It May Concern:    Nessa Briceño  was seen on 11/6/24.  Please excuse her from work missed on November 4, 5, and 7, 2024 due to illness.        Sincerely,      Griselda Cisneros, [facility].

## 2024-11-06 NOTE — PROGRESS NOTES
Nessa is a 54 year old who is being evaluated via a billable video visit.          Assessment & Plan     Viral URI  Work excuse placed in chart.  Continue with over-the-counter treatments for symptom management.  Recheck in a week if not better        Return in about 1 week (around 11/13/2024), or if symptoms worsen or fail to improve.    Subjective   Nessa is a 54 year old, presenting for the following health issues:  No chief complaint on file.    HPI     Acute Illness  Acute illness concerns: cold sx  Onset/Duration: 4 days  Symptoms:  Fever: YES- resolsved  Chills/Sweats: No  Headache (location?): YES  Sinus Pressure: YES  Conjunctivitis:  No  Ear Pain: no  Rhinorrhea: YES  Congestion: YES  Sore Throat: YES  Cough: no  Wheeze: YES  Fatigue/Achiness: YES  Sick/Strep Exposure: No  Therapies tried and outcome: several OTC cold and flu medications  Needs a note for work        Review of Systems  Constitutional, HEENT, cardiovascular, pulmonary, gi and gu systems are negative, except as otherwise noted.      Objective           Vitals:  No vitals were obtained today due to virtual visit.    Physical Exam   GENERAL: alert and no distress  RESP: No audible wheeze, cough, or visible cyanosis.    PSYCH: Appropriate affect, tone, and pace of words          Video-Visit Details    Type of service:  Video Visit   Originating Location (pt. Location): Other car, not driving    Distant Location (provider location):  Off-site  Platform used for Video Visit: Lilliana  Signed Electronically by: [facility]

## 2024-11-08 ENCOUNTER — ANCILLARY PROCEDURE (OUTPATIENT)
Dept: GENERAL RADIOLOGY | Facility: CLINIC | Age: 54
End: 2024-11-08
Attending: PHYSICIAN ASSISTANT
Payer: COMMERCIAL

## 2024-11-08 ENCOUNTER — OFFICE VISIT (OUTPATIENT)
Dept: URGENT CARE | Facility: URGENT CARE | Age: 54
End: 2024-11-08
Payer: COMMERCIAL

## 2024-11-08 VITALS
RESPIRATION RATE: 15 BRPM | HEART RATE: 88 BPM | SYSTOLIC BLOOD PRESSURE: 118 MMHG | TEMPERATURE: 97.7 F | OXYGEN SATURATION: 96 % | BODY MASS INDEX: 26.52 KG/M2 | WEIGHT: 145 LBS | DIASTOLIC BLOOD PRESSURE: 92 MMHG

## 2024-11-08 DIAGNOSIS — R05.1 ACUTE COUGH: ICD-10-CM

## 2024-11-08 DIAGNOSIS — J22 LOWER RESP. TRACT INFECTION: Primary | ICD-10-CM

## 2024-11-08 PROCEDURE — 99214 OFFICE O/P EST MOD 30 MIN: CPT | Performed by: PHYSICIAN ASSISTANT

## 2024-11-08 PROCEDURE — 87798 DETECT AGENT NOS DNA AMP: CPT | Performed by: PHYSICIAN ASSISTANT

## 2024-11-08 PROCEDURE — 71046 X-RAY EXAM CHEST 2 VIEWS: CPT | Mod: TC | Performed by: RADIOLOGY

## 2024-11-08 RX ORDER — AZITHROMYCIN 250 MG/1
TABLET, FILM COATED ORAL
Qty: 6 TABLET | Refills: 0 | Status: SHIPPED | OUTPATIENT
Start: 2024-11-08

## 2024-11-08 RX ORDER — PREDNISONE 20 MG/1
20 TABLET ORAL DAILY
Qty: 5 TABLET | Refills: 0 | Status: SHIPPED | OUTPATIENT
Start: 2024-11-08 | End: 2024-11-13

## 2024-11-08 NOTE — PROGRESS NOTES
Assessment & Plan     Lower resp. tract infection  Chest x-ray possible developing infiltate in right lower lobe. Will treat with azithromycin and prednisone burst. Continue with albuterol inhaler every 4-6 hrs as needed. Get plenty of rest and push fluids. Return to clinic if symptoms worsen or do not improve; otherwise follow up as needed      - azithromycin (ZITHROMAX) 250 MG tablet; Two tablets first day, then one tablet daily for four days.  - predniSONE (DELTASONE) 20 MG tablet; Take 1 tablet (20 mg) by mouth daily for 5 days.    Acute cough    - XR Chest 2 Views; Future  - B. pertussis/parapertussis PCR-NP                Return in about 1 week (around 11/15/2024), or if symptoms worsen or fail to improve.          Subjective   Chief Complaint   Patient presents with    Cough     For 7 days, cough and fever, is taking Dayquil, Nyquil, ibuprofen and tylenol       HPI       URI Adult    Onset of symptoms was 1 week(s) ago.  Course of illness is same.    Severity moderate  Current and Associated symptoms: cough,  fever, reports coughing fits to almost to the point of vomiting   Treatment measures tried include Tylenol/Ibuprofen, albuterol inhaler as needed   Predisposing factors include None.                    Objective    BP (!) 118/92   Pulse 88   Temp 97.7  F (36.5  C) (Tympanic)   Resp 15   Wt 65.8 kg (145 lb)   LMP 09/28/2011   SpO2 96%   BMI 26.52 kg/m    Body mass index is 26.52 kg/m .    Physical Exam  Constitutional:       Appearance: She is well-developed.   HENT:      Head: Normocephalic.      Right Ear: Tympanic membrane and ear canal normal.      Left Ear: Tympanic membrane and ear canal normal.   Eyes:      Conjunctiva/sclera: Conjunctivae normal.   Cardiovascular:      Rate and Rhythm: Normal rate.      Heart sounds: Normal heart sounds.   Pulmonary:      Effort: Pulmonary effort is normal.      Breath sounds: Normal breath sounds.   Skin:     General: Skin is warm and dry.      Findings:  No rash.   Psychiatric:         Behavior: Behavior normal.            CXR - Reviewed and interpreted by me: question infiltrate in right lower lobe        Signed Electronically by: Thuy Encarnacion PA-C

## 2024-11-08 NOTE — LETTER
November 8, 2024      Nessa Briceño  [address] [place] [id number]        To Whom It May Concern:    Nessa Briceño  was seen and treated in clinic. Please excuse from work 11/4/24 through 11/8/24.            Sincerely,          Thuy Encarnacion PA-C

## 2024-11-09 ENCOUNTER — HEALTH MAINTENANCE LETTER (OUTPATIENT)
Age: 54
End: 2024-11-09

## 2024-11-09 LAB
B PARAPERT DNA SPEC QL NAA+PROBE: NOT DETECTED
B PERT DNA SPEC QL NAA+PROBE: NOT DETECTED

## 2024-11-29 DIAGNOSIS — E66.3 OVERWEIGHT: ICD-10-CM

## 2024-12-02 RX ORDER — LIRAGLUTIDE 6 MG/ML
INJECTION, SOLUTION SUBCUTANEOUS
Qty: 15 ML | Refills: 2 | Status: SHIPPED | OUTPATIENT
Start: 2024-12-02

## 2025-03-12 ENCOUNTER — PATIENT OUTREACH (OUTPATIENT)
Dept: CARE COORDINATION | Facility: CLINIC | Age: 55
End: 2025-03-12
Payer: COMMERCIAL

## (undated) DEVICE — ENDO FORCEP ENDOJAW BIOPSY 2.8MMX230CM FB-220U

## (undated) DEVICE — ENDO SNARE EXACTO COLD 9MM LOOP 2.4MMX230CM 00711115